# Patient Record
Sex: FEMALE | Race: WHITE | Employment: UNEMPLOYED | URBAN - METROPOLITAN AREA
[De-identification: names, ages, dates, MRNs, and addresses within clinical notes are randomized per-mention and may not be internally consistent; named-entity substitution may affect disease eponyms.]

---

## 2017-03-20 ENCOUNTER — APPOINTMENT (OUTPATIENT)
Dept: PHYSICAL THERAPY | Age: 32
End: 2017-03-20

## 2018-11-01 ENCOUNTER — HOSPITAL ENCOUNTER (OUTPATIENT)
Dept: LAB | Age: 33
Discharge: HOME | End: 2018-11-01
Payer: OTHER GOVERNMENT

## 2018-11-01 LAB
ANTIBODY SCREEN, EXTERNAL: NEGATIVE
CHLAMYDIA, EXTERNAL: NEGATIVE
HBSAG, EXTERNAL: NEGATIVE
HCT, EXTERNAL: 39.4
HEPATITIS C AB,   EXT: NEGATIVE
HGB, EXTERNAL: 13.1
HIV, EXTERNAL: NEGATIVE
N. GONORRHEA, EXTERNAL: NEGATIVE
PLATELET CNT,   EXTERNAL: 180
RPR, EXTERNAL: NEGATIVE
RUBELLA, EXTERNAL: NORMAL

## 2018-11-01 PROCEDURE — 88175 CYTOPATH C/V AUTO FLUID REDO: CPT | Performed by: OBSTETRICS & GYNECOLOGY

## 2018-11-01 PROCEDURE — 87624 HPV HI-RISK TYP POOLED RSLT: CPT | Performed by: OBSTETRICS & GYNECOLOGY

## 2018-11-27 ENCOUNTER — OFFICE VISIT (OUTPATIENT)
Dept: CARDIOLOGY CLINIC | Age: 33
End: 2018-11-27

## 2018-11-27 VITALS
DIASTOLIC BLOOD PRESSURE: 76 MMHG | WEIGHT: 148 LBS | HEIGHT: 68 IN | OXYGEN SATURATION: 98 % | HEART RATE: 74 BPM | BODY MASS INDEX: 22.43 KG/M2 | SYSTOLIC BLOOD PRESSURE: 121 MMHG

## 2018-11-27 DIAGNOSIS — R00.2 PALPITATIONS: Primary | ICD-10-CM

## 2018-11-27 NOTE — PATIENT INSTRUCTIONS
Jayda Inman will call to schedule your testing within 24-48 hours. If you do not hear from her, then please call her directly at [phone].     All testing/lab work is completed in [address] [place] [address] 150   at [facility] ordered_ They will contact you to

## 2018-11-27 NOTE — PROGRESS NOTES
1. Have you been to the ER, urgent care clinic since your last visit? Hospitalized since your last visit? No    2. Have you seen or consulted any other health care providers outside of the [address] [place] [address] since your last visit? Include any pap smears or colon screening.  No

## 2018-11-27 NOTE — PROGRESS NOTES
[facility]    Ms. Radha Farr is a 28year old female with a history of borderline hypertension. Ms. Radha Farr was asked to come see me for the evaluation of palpitations. Ms. Radha Farr is ten weeks pregnant. She has been experiencing some palpitations on and off for the last five weeks. Initially it was almost on a daily basis and especially happening at nighttime. Over the last couple weeks it has decreased in frequency, however now it is happening sometimes during the daytime. The palpitations last usually less than five seconds and feels like fluttering sensation in the chest.  Sometimes it makes her feel short of breath for a few seconds. She denies any chest pain or chest tightness with rest or exertion. She denies any presyncope or syncope. She had episodes of palpitations in the past, which was six years ago, and she saw a cardiologist in [place] at that time. She denies any exertional symptoms of fatigue. Before she was pregnant she was working out five to six times a week. She denies any excessive weight loss or weight gain. Denies diarrhea or constipation. Denies any excessive heat or cold intolerance. Denies any nausea, vomiting, abdominal pain, fever, chills, sputum production. No hematuria or other bleeding complaints    Past Medical History:   Diagnosis Date    Human papilloma virus 2006    Hypertension          Past Surgical History:   Procedure Laterality Date    HX LEEP PROCEDURE Bilateral 05/2004       Current Outpatient Medications   Medication Sig    prenatal vit-calcium-iron-fa (PRENATAL PLUS WITH CALCIUM) 27 mg iron- 1 mg tab Take 1 Tab by mouth daily. Indications: PREGNANCY    Cholecalciferol, Vitamin D3, (VITAMIN D3) 1,000 unit cap Take 1 Cap by mouth. No current facility-administered medications for this visit.         Allergies and Sensitivities:  Allergies   Allergen Reactions    Penicillin G Rash       Family History:  Family History   Problem Relation Age of Onset    Osteoporosis Mother     Heart Disease Father     Hypertension Father     High Cholesterol Father     Hypertension Sister     Ulcerative Colitis Brother        Social History:  Social History     Tobacco Use    Smoking status: Former Smoker     Last attempt to quit: 2007     Years since quittin.0    Smokeless tobacco: Never Used   Substance Use Topics    Alcohol use: No    Drug use: No     She  reports that she quit smoking about 11 years ago. she has never used smokeless tobacco.  She  reports that she does not drink alcohol. Review of Systems:  Cardiac symptoms as noted above in HPI. All others negative. Denies fatigue, malaise, skin rash, joint pain, blurring vision, photophobia, neck pain, hemoptysis, chronic cough, nausea, vomiting, hematuria, burning micturition, BRBPR, chronic headaches. Physical Exam:  BP Readings from Last 3 Encounters:   18 121/76   16 129/74   07/27/15 122/78         Pulse Readings from Last 3 Encounters:   18 74   16 72   07/27/15 76          Wt Readings from Last 3 Encounters:   18 148 lb (67.1 kg)   16 175 lb (79.4 kg)   07/27/15 144 lb (65.3 kg)       Constitutional: Oriented to person, place, and time. HENT: Head: Normocephalic and atraumatic. Neck: No JVD present. Carotid bruit is not appreciated. Cardiovascular: Regular rhythm. No murmur, gallop or rubs appreciated  Lung: Breath sounds normal. No respiratory distress. No ronchi or rales appreciated  Abdominal: No tenderness. No rebound and no guarding. Musculoskeletal: There is no lower extremity edema. No cynosis  Lymphadenopathy:  No cervical or supraclavicular adenopathy appriciated. Neurological: No gross motor deficit noted. Skin: No visible skin rash noted. No Ear discharge noted  Psychiatric: Normal mood and affect.    Good distal pulse    Review of Data  LABS:   No results found for: NA, K, CL, CO2, GLU, BUN, CREA  No flowsheet data found. No results found for: GPT, ALT  No results found for: HBA1C, HGBE8, GAU4FRGX, HVR8OZAB, UBT2XIHZ    EKG  (11/18) Sinus rhythm 70 bpm.     ECHO    IMPRESSION & PLAN:  Ms. Misbah Cohen is a 28year old female with no significant past medical history. In regards to palpitations, this is happening for the last five weeks. This is happening since she has found out she is pregnant. There is no syncope or presyncope. There is no angina or heart failure symptoms. On EKG there is no preexcitation. I would recommend that she place Event monitor for three to four weeks to see if she has any underlying cardiac arrhythmia. An echocardiogram has been ordered to rule out any structural abnormality of the heart. By history she does not appear to have any symptoms to suggest hyperthyroidism. Lab is being monitored frequently by OB/GYN team.  I will make further recommendations based on test findings as mentioned above. I have reassured her and advised her to keep herself well hydrated. She drinks two coffees a day. I have asked her to reduce or even stop drinking coffee and see if there is any improvement in the symptoms. This plan was discussed with patient who is in agreement. Thank you for allowing me to participate in patient care. Please feel free to call me if you have any question or concern. Pooja España MD  Please note: This document has been produced using voice recognition software. Unrecognized errors in transcription may be present.

## 2018-12-05 ENCOUNTER — HOSPITAL ENCOUNTER (OUTPATIENT)
Dept: NON INVASIVE DIAGNOSTICS | Age: 33
Discharge: HOME | End: 2018-12-05
Attending: INTERNAL MEDICINE
Payer: OTHER GOVERNMENT

## 2018-12-05 DIAGNOSIS — R00.2 PALPITATIONS: ICD-10-CM

## 2018-12-05 PROCEDURE — 93306 TTE W/DOPPLER COMPLETE: CPT

## 2018-12-11 ENCOUNTER — TELEPHONE (OUTPATIENT)
Dept: CARDIOLOGY CLINIC | Age: 33
End: 2018-12-11

## 2018-12-11 NOTE — TELEPHONE ENCOUNTER
Contacted pt at [facility] number. Two patient Identifiers confirmed. Advised pt per Dr Shirley Grissom. Advised preventice was processing. Pt verbalized understanding.

## 2018-12-11 NOTE — TELEPHONE ENCOUNTER
Patient would like to be called with her Echocardiogram results, she states she has not been contacted regarding her heart monitor. Please call and advise.

## 2018-12-20 ENCOUNTER — TELEPHONE (OUTPATIENT)
Dept: CARDIOLOGY CLINIC | Age: 33
End: 2018-12-20

## 2018-12-20 NOTE — TELEPHONE ENCOUNTER
Verbal order and read back per Ginna Reid MD  Advised pt of importance of having monitor but if she would like to d/c because of symptoms ok.

## 2018-12-20 NOTE — TELEPHONE ENCOUNTER
Patient would like to take off her 30 day event monitor early, due to skin irritation. Please call and advise.

## 2018-12-20 NOTE — TELEPHONE ENCOUNTER
Contacted pt at [facility] number. Two patient Identifiers confirmed. Advised pt per Dr Leonel Fajardo notes. Pt verbalized understanding and stated she wishes to d/c.

## 2019-01-11 DIAGNOSIS — R00.2 PALPITATIONS: Primary | ICD-10-CM

## 2019-01-11 NOTE — PROGRESS NOTES
Per office visit on 11/27/18, placed order for 30 day event monitor.      Verbal order and read back per Gareth Dhaliwal MD

## 2019-01-22 ENCOUNTER — OFFICE VISIT (OUTPATIENT)
Dept: CARDIOLOGY CLINIC | Age: 34
End: 2019-01-22

## 2019-01-22 VITALS
DIASTOLIC BLOOD PRESSURE: 67 MMHG | SYSTOLIC BLOOD PRESSURE: 113 MMHG | WEIGHT: 153 LBS | HEART RATE: 82 BPM | HEIGHT: 68 IN | OXYGEN SATURATION: 99 % | BODY MASS INDEX: 23.19 KG/M2

## 2019-01-22 DIAGNOSIS — R00.2 PALPITATIONS: Primary | ICD-10-CM

## 2019-01-22 NOTE — PROGRESS NOTES
[facility]    Ms. Karyle Chew is a 28 y.o. female with a history of borderline hypertension. Patient is here today for follow-up appointment. She admits that compared to last visit, she is feeling much better. Around the [place] time, she was stressed out because of holiday season and she felt some palpitation and fluttering. Her heart rate at the time was 90 bpm which according to the patient is high for her. She denies any presyncope or syncope she denies any resting or exertional chest pain or chest tightness to be concerned of angina. She denies PND or lower cavity swelling. Past Medical History:   Diagnosis Date    Human papilloma virus 2006    Hypertension          Past Surgical History:   Procedure Laterality Date    HX LEEP PROCEDURE Bilateral 2004       Current Outpatient Medications   Medication Sig    prenatal vit-calcium-iron-fa (PRENATAL PLUS WITH CALCIUM) 27 mg iron- 1 mg tab Take 1 Tab by mouth daily. Indications: PREGNANCY    Cholecalciferol, Vitamin D3, (VITAMIN D3) 1,000 unit cap Take 1 Cap by mouth. No current facility-administered medications for this visit. Allergies and Sensitivities:  Allergies   Allergen Reactions    Penicillin G Rash       Family History:  Family History   Problem Relation Age of Onset    Osteoporosis Mother     Heart Disease Father     Hypertension Father     High Cholesterol Father     Hypertension Sister     Ulcerative Colitis Brother        Social History:  Social History     Tobacco Use    Smoking status: Former Smoker     Last attempt to quit: 2007     Years since quittin.1    Smokeless tobacco: Never Used   Substance Use Topics    Alcohol use: No    Drug use: No     She  reports that she quit smoking about 11 years ago. she has never used smokeless tobacco.  She  reports that she does not drink alcohol.     Review of Systems:  Cardiac symptoms as noted above in HPI. All others negative. Denies fatigue, malaise, skin rash, joint pain, blurring vision, photophobia, neck pain, hemoptysis, chronic cough, nausea, vomiting, hematuria, burning micturition, BRBPR, chronic headaches. Physical Exam:  BP Readings from Last 3 Encounters:   01/22/19 113/67   11/27/18 121/76   12/30/16 129/74         Pulse Readings from Last 3 Encounters:   01/22/19 82   11/27/18 74   12/30/16 72          Wt Readings from Last 3 Encounters:   01/22/19 153 lb (69.4 kg)   11/27/18 148 lb (67.1 kg)   12/28/16 175 lb (79.4 kg)       Constitutional: Oriented to person, place, and time. HENT: Head: Normocephalic and atraumatic. Neck: No JVD present. Carotid bruit is not appreciated. Cardiovascular: Regular rhythm. No murmur, gallop or rubs appreciated  Lung: Breath sounds normal. No respiratory distress. No ronchi or rales appreciated  Abdominal: No tenderness. No rebound and no guarding. Musculoskeletal: There is no lower extremity edema. No cynosis    Review of Data  LABS:   No results found for: NA, K, CL, CO2, GLU, BUN, CREA  No flowsheet data found. No results found for: GPT, ALT  No results found for: HBA1C, HGBE8, SNA7HUFW, KFC9MQTM, VYF7KVOY    EKG  (11/18) Sinus rhythm 70 bpm.     ECHO (01/19)  Left Ventricle Normal cavity size and wall thickness. There is low normal systolic function. The calculated ejection fraction is 50%. Visually measured ejection fraction. There is age-appropriate left ventricular diastolic function. Left Atrium Normal cavity size. Left Atrium volume index is 28 mL/m2. Right Ventricle Normal cavity size and global systolic function. Right Atrium Normal size. Aortic Valve Trileaflet aortic valve structure. No stenosis and no regurgitation. Mitral Valve Normal valve structure and no stenosis. Trace regurgitation. Tricuspid Valve Normal valve structure and no stenosis. Trace tricuspid valve regurgitation.    Pulmonic Valve Normal valve structure and no stenosis. Trace regurgitation. Aorta Normal aortic root, ascending aortic, and aortic arch. Event monitor: (12/18)  Sinus rhythm at 75 bpm ( bpm)  No evidence of atrial fibrillation  546 total PVCs, less than 1%  No heart block or pauses noted  1 event of most likely rate related aberrancy with a total of 12 beats at 110 bpm, asymptomatic    IMPRESSION & PLAN:  Ms. Mick Orozco is a 28 y.o.  female with no significant past medical history. In regards to palpitations, per patient this appears to be much better compared to last visit. She thinks that her stress level is reducing. Her echocardiogram and event monitor in December 2018, as mentioned above. No valvular heart disease noted. No significant symptomatic arrhythmia noted. I discussed this results with patient in detail. As her symptoms are significantly improving and she has no presyncope or syncope or any other symptoms to suggest angina or heart failure at this time, I do not believe any further cardiac workup is needed. She was advised to contact me if she has any worsening of the symptoms or she has any presyncope or syncope. This plan was discussed with patient who is in agreement. Thank you for allowing me to participate in patient care. Please feel free to call me if you have any question or concern. Shantanu River MD  Please note: This document has been produced using voice recognition software. Unrecognized errors in transcription may be present.

## 2019-05-22 LAB — GRBS, EXTERNAL: NEGATIVE

## 2019-06-24 ENCOUNTER — HOSPITAL ENCOUNTER (INPATIENT)
Age: 34
LOS: 2 days | Discharge: HOME | End: 2019-06-26
Attending: OBSTETRICS & GYNECOLOGY | Admitting: OBSTETRICS & GYNECOLOGY
Payer: OTHER GOVERNMENT

## 2019-06-24 PROBLEM — O09.523 MULTIGRAVIDA OF ADVANCED MATERNAL AGE IN THIRD TRIMESTER: Status: ACTIVE | Noted: 2019-06-24

## 2019-06-24 PROBLEM — Z34.90 PREGNANT: Status: ACTIVE | Noted: 2018-11-01

## 2019-06-24 PROCEDURE — 65270000029 HC RM PRIVATE

## 2019-06-24 RX ORDER — MISOPROSTOL 200 UG/1
800 TABLET ORAL
Status: DISPENSED | OUTPATIENT
Start: 2019-06-24 | End: 2019-06-25

## 2019-06-24 RX ORDER — SALICYLIC ACID
90 POWDER (GRAM) MISCELLANEOUS ONCE
Status: COMPLETED | OUTPATIENT
Start: 2019-06-25 | End: 2019-06-25

## 2019-06-24 RX ORDER — PROMETHAZINE HYDROCHLORIDE 25 MG/ML
25 INJECTION, SOLUTION INTRAMUSCULAR; INTRAVENOUS
Status: DISCONTINUED | OUTPATIENT
Start: 2019-06-24 | End: 2019-06-25 | Stop reason: HOSPADM

## 2019-06-24 RX ORDER — LIDOCAINE HYDROCHLORIDE 10 MG/ML
20 INJECTION, SOLUTION EPIDURAL; INFILTRATION; INTRACAUDAL; PERINEURAL AS NEEDED
Status: DISCONTINUED | OUTPATIENT
Start: 2019-06-24 | End: 2019-06-25 | Stop reason: HOSPADM

## 2019-06-24 RX ORDER — SODIUM CHLORIDE, SODIUM LACTATE, POTASSIUM CHLORIDE, CALCIUM CHLORIDE 600; 310; 30; 20 MG/100ML; MG/100ML; MG/100ML; MG/100ML
125 INJECTION, SOLUTION INTRAVENOUS CONTINUOUS
Status: DISCONTINUED | OUTPATIENT
Start: 2019-06-25 | End: 2019-06-25 | Stop reason: HOSPADM

## 2019-06-24 RX ORDER — METHYLERGONOVINE MALEATE 0.2 MG/ML
0.2 INJECTION INTRAVENOUS AS NEEDED
Status: DISCONTINUED | OUTPATIENT
Start: 2019-06-24 | End: 2019-06-25 | Stop reason: HOSPADM

## 2019-06-24 RX ORDER — OXYTOCIN 10 [USP'U]/ML
10 INJECTION, SOLUTION INTRAMUSCULAR; INTRAVENOUS
Status: DISCONTINUED | OUTPATIENT
Start: 2019-06-24 | End: 2019-06-25 | Stop reason: HOSPADM

## 2019-06-24 RX ORDER — NALBUPHINE HYDROCHLORIDE 10 MG/ML
10 INJECTION, SOLUTION INTRAMUSCULAR; INTRAVENOUS; SUBCUTANEOUS
Status: DISCONTINUED | OUTPATIENT
Start: 2019-06-24 | End: 2019-06-25 | Stop reason: HOSPADM

## 2019-06-24 RX ORDER — OXYTOCIN/RINGER'S LACTATE 20/1000 ML
125 PLASTIC BAG, INJECTION (ML) INTRAVENOUS CONTINUOUS
Status: DISCONTINUED | OUTPATIENT
Start: 2019-06-25 | End: 2019-06-25 | Stop reason: HOSPADM

## 2019-06-24 RX ORDER — TERBUTALINE SULFATE 1 MG/ML
0.25 INJECTION SUBCUTANEOUS
Status: DISCONTINUED | OUTPATIENT
Start: 2019-06-24 | End: 2019-06-25 | Stop reason: HOSPADM

## 2019-06-24 RX ORDER — OXYTOCIN/RINGER'S LACTATE 20/1000 ML
999 PLASTIC BAG, INJECTION (ML) INTRAVENOUS ONCE
Status: COMPLETED | OUTPATIENT
Start: 2019-06-25 | End: 2019-06-25

## 2019-06-24 RX ORDER — MAG HYDROX/ALUMINUM HYD/SIMETH 200-200-20
15 SUSPENSION, ORAL (FINAL DOSE FORM) ORAL
Status: DISCONTINUED | OUTPATIENT
Start: 2019-06-24 | End: 2019-06-25 | Stop reason: HOSPADM

## 2019-06-25 ENCOUNTER — ANESTHESIA (OUTPATIENT)
Dept: LABOR AND DELIVERY | Age: 34
End: 2019-06-25
Payer: OTHER GOVERNMENT

## 2019-06-25 ENCOUNTER — ANESTHESIA EVENT (OUTPATIENT)
Dept: LABOR AND DELIVERY | Age: 34
End: 2019-06-25
Payer: OTHER GOVERNMENT

## 2019-06-25 PROBLEM — O09.523 MULTIGRAVIDA OF ADVANCED MATERNAL AGE IN THIRD TRIMESTER: Status: RESOLVED | Noted: 2019-06-24 | Resolved: 2019-06-25

## 2019-06-25 PROBLEM — Z34.90 PREGNANT: Status: RESOLVED | Noted: 2018-11-01 | Resolved: 2019-06-25

## 2019-06-25 LAB
ABO + RH BLD: NORMAL
BASOPHILS # BLD: 0 K/UL (ref 0–0.1)
BASOPHILS NFR BLD: 0 % (ref 0–2)
BLOOD GROUP ANTIBODIES SERPL: NORMAL
DIFFERENTIAL METHOD BLD: ABNORMAL
EOSINOPHIL # BLD: 0.1 K/UL (ref 0–0.4)
EOSINOPHIL NFR BLD: 1 % (ref 0–5)
ERYTHROCYTE [DISTWIDTH] IN BLOOD BY AUTOMATED COUNT: 13.4 % (ref 11.6–14.5)
HCT VFR BLD AUTO: 36.3 % (ref 35–45)
HGB BLD-MCNC: 12.4 G/DL (ref 12–16)
LYMPHOCYTES # BLD: 1.6 K/UL (ref 0.9–3.6)
LYMPHOCYTES NFR BLD: 19 % (ref 21–52)
MCH RBC QN AUTO: 30.7 PG (ref 24–34)
MCHC RBC AUTO-ENTMCNC: 34.2 G/DL (ref 31–37)
MCV RBC AUTO: 89.9 FL (ref 74–97)
MONOCYTES # BLD: 0.5 K/UL (ref 0.05–1.2)
MONOCYTES NFR BLD: 6 % (ref 3–10)
NEUTS SEG # BLD: 6.4 K/UL (ref 1.8–8)
NEUTS SEG NFR BLD: 74 % (ref 40–73)
PLATELET # BLD AUTO: 136 K/UL (ref 135–420)
PMV BLD AUTO: 11.6 FL (ref 9.2–11.8)
RBC # BLD AUTO: 4.04 M/UL (ref 4.2–5.3)
SPECIMEN EXP DATE BLD: NORMAL
WBC # BLD AUTO: 8.5 K/UL (ref 4.6–13.2)

## 2019-06-25 PROCEDURE — 86900 BLOOD TYPING SEROLOGIC ABO: CPT

## 2019-06-25 PROCEDURE — 77030007879 HC KT SPN EPDRL TELE -B: Performed by: NURSE ANESTHETIST, CERTIFIED REGISTERED

## 2019-06-25 PROCEDURE — 74011000250 HC RX REV CODE- 250

## 2019-06-25 PROCEDURE — 85025 COMPLETE CBC W/AUTO DIFF WBC: CPT

## 2019-06-25 PROCEDURE — 75410000000 HC DELIVERY VAGINAL/SINGLE

## 2019-06-25 PROCEDURE — 75410000002 HC LABOR FEE PER 1 HR

## 2019-06-25 PROCEDURE — 74011250637 HC RX REV CODE- 250/637: Performed by: ADVANCED PRACTICE MIDWIFE

## 2019-06-25 PROCEDURE — 74011250636 HC RX REV CODE- 250/636: Performed by: NURSE ANESTHETIST, CERTIFIED REGISTERED

## 2019-06-25 PROCEDURE — 76060000078 HC EPIDURAL ANESTHESIA

## 2019-06-25 PROCEDURE — 74011000250 HC RX REV CODE- 250: Performed by: NURSE ANESTHETIST, CERTIFIED REGISTERED

## 2019-06-25 PROCEDURE — 74011250636 HC RX REV CODE- 250/636: Performed by: ADVANCED PRACTICE MIDWIFE

## 2019-06-25 PROCEDURE — 65270000029 HC RM PRIVATE

## 2019-06-25 PROCEDURE — 77030034849

## 2019-06-25 PROCEDURE — 75410000003 HC RECOV DEL/VAG/CSECN EA 0.5 HR

## 2019-06-25 RX ORDER — LIDOCAINE HYDROCHLORIDE AND EPINEPHRINE 15; 5 MG/ML; UG/ML
INJECTION, SOLUTION EPIDURAL AS NEEDED
Status: DISCONTINUED | OUTPATIENT
Start: 2019-06-25 | End: 2019-06-25 | Stop reason: HOSPADM

## 2019-06-25 RX ORDER — PROMETHAZINE HYDROCHLORIDE 25 MG/ML
25 INJECTION, SOLUTION INTRAMUSCULAR; INTRAVENOUS
Status: DISCONTINUED | OUTPATIENT
Start: 2019-06-25 | End: 2019-06-26 | Stop reason: HOSPADM

## 2019-06-25 RX ORDER — DIPHENHYDRAMINE HYDROCHLORIDE 50 MG/ML
25 INJECTION, SOLUTION INTRAMUSCULAR; INTRAVENOUS
Status: DISCONTINUED | OUTPATIENT
Start: 2019-06-25 | End: 2019-06-25 | Stop reason: HOSPADM

## 2019-06-25 RX ORDER — OXYTOCIN/0.9 % SODIUM CHLORIDE 30/500 ML
0-20 PLASTIC BAG, INJECTION (ML) INTRAVENOUS
Status: DISCONTINUED | OUTPATIENT
Start: 2019-06-25 | End: 2019-06-26

## 2019-06-25 RX ORDER — SODIUM CHLORIDE 0.9 % (FLUSH) 0.9 %
5-40 SYRINGE (ML) INJECTION EVERY 8 HOURS
Status: DISCONTINUED | OUTPATIENT
Start: 2019-06-25 | End: 2019-06-25 | Stop reason: HOSPADM

## 2019-06-25 RX ORDER — BUPIVACAINE HYDROCHLORIDE 2.5 MG/ML
INJECTION, SOLUTION EPIDURAL; INFILTRATION; INTRACAUDAL AS NEEDED
Status: DISCONTINUED | OUTPATIENT
Start: 2019-06-25 | End: 2019-06-25 | Stop reason: HOSPADM

## 2019-06-25 RX ORDER — ACETAMINOPHEN 325 MG/1
650 TABLET ORAL
Status: DISCONTINUED | OUTPATIENT
Start: 2019-06-25 | End: 2019-06-26 | Stop reason: HOSPADM

## 2019-06-25 RX ORDER — IBUPROFEN 400 MG/1
800 TABLET ORAL
Status: DISCONTINUED | OUTPATIENT
Start: 2019-06-25 | End: 2019-06-26 | Stop reason: HOSPADM

## 2019-06-25 RX ORDER — SODIUM CHLORIDE 0.9 % (FLUSH) 0.9 %
5-40 SYRINGE (ML) INJECTION AS NEEDED
Status: DISCONTINUED | OUTPATIENT
Start: 2019-06-25 | End: 2019-06-25 | Stop reason: HOSPADM

## 2019-06-25 RX ORDER — AMOXICILLIN 250 MG
1 CAPSULE ORAL 2 TIMES DAILY
Status: DISCONTINUED | OUTPATIENT
Start: 2019-06-25 | End: 2019-06-26 | Stop reason: HOSPADM

## 2019-06-25 RX ORDER — PHENYLEPHRINE HCL IN 0.9% NACL 0.4MG/10ML
100 SYRINGE (ML) INTRAVENOUS AS NEEDED
Status: DISCONTINUED | OUTPATIENT
Start: 2019-06-25 | End: 2019-06-25 | Stop reason: HOSPADM

## 2019-06-25 RX ORDER — NALOXONE HYDROCHLORIDE 0.4 MG/ML
0.2 INJECTION, SOLUTION INTRAMUSCULAR; INTRAVENOUS; SUBCUTANEOUS AS NEEDED
Status: DISCONTINUED | OUTPATIENT
Start: 2019-06-25 | End: 2019-06-25 | Stop reason: HOSPADM

## 2019-06-25 RX ADMIN — IBUPROFEN 800 MG: 400 TABLET ORAL at 09:26

## 2019-06-25 RX ADMIN — SODIUM CHLORIDE, SODIUM LACTATE, POTASSIUM CHLORIDE, AND CALCIUM CHLORIDE 125 ML/HR: 600; 310; 30; 20 INJECTION, SOLUTION INTRAVENOUS at 00:00

## 2019-06-25 RX ADMIN — OXYTOCIN-SODIUM CHLORIDE 0.9% IV SOLN 30 UNIT/500ML 4 MILLI-UNITS/MIN: 30-0.9/5 SOLUTION at 04:51

## 2019-06-25 RX ADMIN — Medication 10 ML/HR: at 02:43

## 2019-06-25 RX ADMIN — SODIUM CHLORIDE, SODIUM LACTATE, POTASSIUM CHLORIDE, AND CALCIUM CHLORIDE 125 ML/HR: 600; 310; 30; 20 INJECTION, SOLUTION INTRAVENOUS at 04:27

## 2019-06-25 RX ADMIN — Medication 19980 MILLI-UNITS/HR: at 06:43

## 2019-06-25 RX ADMIN — LIDOCAINE HYDROCHLORIDE AND EPINEPHRINE 3 ML: 15; 5 INJECTION, SOLUTION EPIDURAL at 02:35

## 2019-06-25 RX ADMIN — BUPIVACAINE HYDROCHLORIDE 5 ML: 2.5 INJECTION, SOLUTION EPIDURAL; INFILTRATION; INTRACAUDAL at 02:42

## 2019-06-25 RX ADMIN — CASTOR OIL 60 ML: 1 LIQUID ORAL at 06:10

## 2019-06-25 RX ADMIN — BUPIVACAINE HYDROCHLORIDE 5 ML: 2.5 INJECTION, SOLUTION EPIDURAL; INFILTRATION; INTRACAUDAL at 02:38

## 2019-06-25 RX ADMIN — IBUPROFEN 800 MG: 400 TABLET ORAL at 17:26

## 2019-06-25 RX ADMIN — OXYTOCIN-SODIUM CHLORIDE 0.9% IV SOLN 30 UNIT/500ML 2 MILLI-UNITS/MIN: 30-0.9/5 SOLUTION at 04:23

## 2019-06-25 RX ADMIN — ACETAMINOPHEN 650 MG: 325 TABLET, FILM COATED ORAL at 19:44

## 2019-06-25 RX ADMIN — SENNOSIDES AND DOCUSATE SODIUM 1 TABLET: 8.6; 5 TABLET ORAL at 15:28

## 2019-06-25 RX ADMIN — SODIUM CHLORIDE, SODIUM LACTATE, POTASSIUM CHLORIDE, AND CALCIUM CHLORIDE 1000 ML: 600; 310; 30; 20 INJECTION, SOLUTION INTRAVENOUS at 01:15

## 2019-06-25 RX ADMIN — ACETAMINOPHEN 650 MG: 325 TABLET, FILM COATED ORAL at 15:28

## 2019-06-25 NOTE — H&P
History & Physical    Name: Neil Gr MRN: [id number]  SSN: [id number]    YOB: 1985  Age: 27 y.o. Sex: female      Subjective:     Estimated Date of Delivery: 19  OB History        6    Para   2    Term   2            AB   3    Living   2       SAB   3    TAB        Ectopic        Molar        Multiple   0    Live Births   2                14 term , boy, unmedicated  12/15 SAB  16 SAB  16 term , boy, epidural   SAB    Neil Gr has been having ctxs with increasing intensity all day. She is admitted with pregnancy at 40w5d for early labor. Prenatal course was notable for advanced maternal age. All testing wnl. Please see prenatal records for details. She began prenatal care with [facility] on 2018 at 7.1 wks GA confirmed by TVUS. Her total weight gain for this pregnancy has been 31 lbs. No Known Allergies  Family History   Problem Relation Age of Onset    Osteoporosis Mother     Heart Disease Father     Hypertension Father     High Cholesterol Father     Hypertension Sister     Ulcerative Colitis Brother       reports that she quit smoking about 11 years ago. She has never used smokeless tobacco. She reports that she does not drink alcohol or use drugs. Objective:     Vitals:  Vitals:    19 2326   Weight: 79.4 kg (175 lb)   Height: 5' 8\" (1.727 m)        Physical Exam:  Patient with distress. of labor  5 cm dilated    80% effaced    -2 station    Presenting Part: cephalic  Cervical Position: mid position  Consistency: Soft  Membranes:  Intact  Fetal Heart Rate & Contraction pattern: Baseline: 120 per minute  Variability: moderate  Accelerations: yes  Decelerations: none  Uterine contractions: irregular, every 5-8 minutes, lasting 60 seconds, mild-mod to palpation  EFW: 7 lbs    Prenatal Labs:   O NEGATIVE, immune, HIV/HepB/HepC/GC/CT neg, VDRL NR    Group B Strep was negative.     Assessment/Plan: Plan: Admit to [phone] for labor. She had planned an unmedicated birth, but tired from ctxs so discussing pain management plan. Will start IV access and give a bolus now to see how labor may be improved. Will monitor intermittently per protocol and pt's preference. Anticipate vaginal birth. Sharmin Bernard MD notified.      Signed By:  Katarina Walton CNM     June 24, 2019

## 2019-06-25 NOTE — ANESTHESIA PREPROCEDURE EVALUATION
Relevant Problems   No relevant active problems       Anesthetic History   No history of anesthetic complications            Review of Systems / Medical History  Patient summary reviewed and nursing notes reviewed    Pulmonary  Within defined limits                 Neuro/Psych   Within defined limits           Cardiovascular    Hypertension: well controlled                   GI/Hepatic/Renal  Within defined limits              Endo/Other  Within defined limits           Other Findings              Physical Exam    Airway  Mallampati: II  TM Distance: 4 - 6 cm  Neck ROM: normal range of motion        Cardiovascular  Regular rate and rhythm,  S1 and S2 normal,  no murmur, click, rub, or gallop             Dental  No notable dental hx       Pulmonary  Breath sounds clear to auscultation               Abdominal  GI exam deferred       Other Findings            Anesthetic Plan    ASA: 2  Anesthesia type: epidural            Anesthetic plan and risks discussed with: Patient and Family

## 2019-06-25 NOTE — ROUTINE PROCESS
TRANSFER - IN REPORT: 
 
Verbal report received from Ailyn Ventura RN M(name) on Selma Chapa  being received from L&D(unit) for routine progression of care Report consisted of patients Situation, Background, Assessment and  
Recommendations(SBAR). Information from the following report(s) SBAR, Kardex, [facility] and Recent Results was reviewed with the receiving nurse. Opportunity for questions and clarification was provided. Assessment completed upon patients arrival to unit and care assumed.

## 2019-06-25 NOTE — ANESTHESIA PROCEDURE NOTES
Epidural Block    Start time: 6/25/2019 2:21 AM  End time: 6/25/2019 2:47 AM  Performed by: Tommie Cronin CRNA  Authorized by: Tommie Cronin CRNA     Pre-Procedure  Indication: labor epidural    Preanesthetic Checklist: patient identified, risks and benefits discussed, anesthesia consent, site marked, patient being monitored, timeout performed and anesthesia consent    Timeout Time: 02:24        Epidural:   Patient position:  Seated  Prep region:  Lumbar  Prep: Betadine    Location:  L3-4    Needle and Epidural Catheter:   Needle Type:  Tuohy  Needle Gauge:  18 G  Injection Technique:  Loss of resistance using saline  Attempts:  1  Catheter Size:  20 G  Catheter at Skin Depth (cm):  14  Depth in Epidural Space (cm):  7  Events: no blood with aspiration, no cerebrospinal fluid with aspiration, no paresthesia and negative aspiration test    Test Dose:  Negative    Assessment:   Catheter Secured:  Tegaderm and tape  Insertion:  Uncomplicated  Patient tolerance:  Patient tolerated the procedure well with no immediate complications

## 2019-06-25 NOTE — PROGRESS NOTES
presenting with ucs since 1830. Ucs strong , about q8 mins. Pt wishes to stat in her clothes for now. Discussed need for NST. PT LYING IN HER SIDE FOR MONITORING. SO @ bedside, supportive  2350 Gerald Musa @ bedside, Talking with pt, pt breathing, standing up with ucs. Pt undecided about management of Labor. 0030 Labs drawn, I v infusing  0100 In the shower after IV absorbed and monitoring. 0155 Out of shower, resting over the ball, wondering about Epidural.  0210 Anaesthesia paged. Pt coping but tierd   0225 Anaesthesia @ bedside  0228 Time out done   0236 Test dose  0239 L tilt Pt has congested cough  0245 Continous infusion forEpidural begun  0250 Comfortable. Loose congested cough, breath sounds clear. Declines any intervention for cough   0325 Turned on R side  0515 Turned on l side earlier, now c/o Srom, clear, L leg very heavy,willturn back on R SIDE.  0545 Pt called  Felt some pressure  0555 Sve by Gerald Musa, c/c 0  0602 Pt instructed on pushing, great progress noted  0614  OF vmi over intact perineum. Baby skin to skin.   4106 Baby @breast, good latch,Fundus firm

## 2019-06-25 NOTE — PROGRESS NOTES
0700 Received report from offgoing nurse M D\"Lex rn to oncoming nurse PRAVEEN Lilly rn via sbar at bedside 0930 Breakfast given ,baby to nursery for peds. Patient declines any needs 0930 Patient up to void \"left leg a little nuymb\" pericare given . Transfered to room 3405 via w/c with supportive  and baby at bedside. [facility] 0715 report given to MAURA Camacho rn

## 2019-06-25 NOTE — PROGRESS NOTES
Visited with mother and acquired permission to announce baby's name.     Candi Ortiz   Sp[facility]   [phone]

## 2019-06-25 NOTE — PROGRESS NOTES
Sandra Casey would like epidural. Says she is tired and can't imagine how she will push out a baby. Concerned about stories she has heard regarding ineffective pain relief despite her own experience with great epidural. Discussed management if epidural in adequate. She is also asking about Pitocin augmentation. Will start after epidural if indicated.

## 2019-06-25 NOTE — ROUTINE PROCESS
Bedside shift change report given to Cristina Borrero RN (oncoming nurse) by Salena Parrish RN (offgoing nurse). Report included the following information SBAR, Kardex, Procedure Summary, Intake/Output, MAR and Recent Results.

## 2019-06-26 VITALS
OXYGEN SATURATION: 99 % | DIASTOLIC BLOOD PRESSURE: 87 MMHG | HEIGHT: 68 IN | HEART RATE: 71 BPM | RESPIRATION RATE: 18 BRPM | SYSTOLIC BLOOD PRESSURE: 123 MMHG | BODY MASS INDEX: 26.52 KG/M2 | TEMPERATURE: 97.6 F | WEIGHT: 175 LBS

## 2019-06-26 LAB
HCT VFR BLD AUTO: 35.2 % (ref 35–45)
HGB BLD-MCNC: 11.6 G/DL (ref 12–16)

## 2019-06-26 PROCEDURE — 85018 HEMOGLOBIN: CPT

## 2019-06-26 PROCEDURE — 36415 COLL VENOUS BLD VENIPUNCTURE: CPT

## 2019-06-26 PROCEDURE — 85461 HEMOGLOBIN FETAL: CPT

## 2019-06-26 PROCEDURE — 74011250636 HC RX REV CODE- 250/636: Performed by: ADVANCED PRACTICE MIDWIFE

## 2019-06-26 PROCEDURE — 74011250637 HC RX REV CODE- 250/637: Performed by: ADVANCED PRACTICE MIDWIFE

## 2019-06-26 PROCEDURE — 85014 HEMATOCRIT: CPT

## 2019-06-26 PROCEDURE — 86900 BLOOD TYPING SEROLOGIC ABO: CPT

## 2019-06-26 RX ORDER — IBUPROFEN 800 MG/1
800 TABLET ORAL
Qty: 60 TAB | Refills: 1 | Status: SHIPPED | OUTPATIENT
Start: 2019-06-26

## 2019-06-26 RX ADMIN — IBUPROFEN 800 MG: 400 TABLET ORAL at 12:12

## 2019-06-26 RX ADMIN — HUMAN RHO(D) IMMUNE GLOBULIN 0.3 MG: 300 INJECTION, SOLUTION INTRAMUSCULAR at 15:12

## 2019-06-26 RX ADMIN — IBUPROFEN 800 MG: 400 TABLET ORAL at 03:28

## 2019-06-26 RX ADMIN — SENNOSIDES AND DOCUSATE SODIUM 1 TABLET: 8.6; 5 TABLET ORAL at 05:44

## 2019-06-26 RX ADMIN — ACETAMINOPHEN 650 MG: 325 TABLET, FILM COATED ORAL at 00:52

## 2019-06-26 NOTE — DISCHARGE INSTRUCTIONS

## 2019-06-26 NOTE — PROGRESS NOTES
Postpartum Day 1    Patient doing well post-partum without significant complaint. Pain controlled with current medication. Voiding without difficulty, normal lochia. Breastfeeding well. Baby stable. Pt is desiring discharge home today if pediatrician is in agreement. Vitals:    Patient Vitals for the past 8 hrs:   BP Temp Pulse Resp SpO2   19 0316 116/66 97.7 °F (36.5 °C) 61 16 100 %     Temp (24hrs), Av °F (36.7 °C), Min:97.7 °F (36.5 °C), Max:98.3 °F (36.8 °C)      Vital signs stable, afebrile. Exam:  Patient without distress. Breasts intact and nontender               Abdomen soft, fundus firm at level of umbilicus, nontender               Perineum with normal lochia noted. Lower extremities are negative for swelling or tenderness. Lab/Data Review:  CBC:   Lab Results   Component Value Date/Time    HGB 11.6 (L) 2019 05:45 AM    HCT 35.2 2019 05:45 AM       Assessment and Plan:  Patient appears to be having uncomplicated post-partum course. Continue routine perineal care and maternal education. Plan discharge tomorrow if no problems occur.     Jatinder Coronado, ELLIOTT  2019  8:10 AM

## 2019-06-26 NOTE — LACTATION NOTE
Mother breast fed two other babies. Mom states this baby has been latching and nursing well and baby is 32 hours old. Very experienced mother. Reviewed typical  feeding patterns/expectations in the first 24 hours. General discussion. Gave BF information, daily log and resource guide. Offered assistance if needed.

## 2019-06-26 NOTE — DISCHARGE SUMMARY
Obstetrical Discharge Summary       [place] [address] [facility] [address]  [address] [facility]  [phone]        Patient ID:[facility] CHANTELLZDAMION,[id number],84 y.o.,1985    Postpartum Day:    Information for the patient's :  Ct Ervin [[id number]]   2 days       Admit Date: 2019    Discharge Date: 2019     Admitting Physician: Kirstin Trevino MD     Admission Diagnoses: Labor and delivery indication for care or intervention [O75.9]    Discharge Diagnoses: same as above      Additional Diagnoses:Present on Admission:   Rh negative status during pregnancy in third trimester, antepartum   (Resolved) Pregnant   (Resolved) Multigravida of advanced maternal age in third trimester       Patient Active Problem List   Diagnosis Code    Rh negative status during pregnancy in third trimester, antepartum O26.893, Z67.91    Postpartum care following vaginal delivery Z39.2         Procedure:        Baby link  Information for the patient's :  Ct Ervin [[id number]]     Delivery Type: Vaginal, Spontaneous   Delivery Date: 2019   Delivery Time: 6:14 AM     Birth Weight: 4.21 kg     Sex:  male  Delivery Clinician:  Jules Hermosillo   Gestational Age: 38w9d    Presentation: Vertex   Position: Left  Occiput  Posterior     Apgars were 8  and 9      Resuscitation Method: Tactile Stimulation     Meconium Stained: None    Living Status: Living       Placenta Date/Time: 2019  6:23 AM   Placenta Removal: Expressed   Placenta Appearance: Normal;Intact    Cord Information: 3 Vessels    Cord Events: None       Disposition of Cord Blood: Lab    Blood Gases Sent?:  No         Baby procedures:    Information for the patient's :  Ct Ervin [[id number]]   Procedure to be Performed: circumcision      Feeding Method: Infant Feeding: Breastmilk    Immunizations:    Immunization History   Administered Date(s) Administered    DTaP 2019    MMR 2014  Rho(D) Immune Globulin - IM 2019, 2019        Immunizations:    Immunization History   Administered Date(s) Administered    DTaP 2019    MMR 2014    Rho(D) Immune Globulin - IM 2019, 2019       Group Beta Strep:   GrBStrep, External   Date Value Ref Range Status   2019 NEGATIVE  Final          WBC   Date/Time Value Ref Range Status   2019 12:28 AM 8.5 4.6 - 13.2 K/uL Final   2016 08:30 PM 11.2 4.6 - 13.2 K/uL Final   2014 12:30 AM 13.4 (H) 4.6 - 13.2 K/uL Final     HGB   Date/Time Value Ref Range Status   2019 05:45 AM 11.6 (L) 12.0 - 16.0 g/dL Final   2019 12:28 AM 12.4 12.0 - 16.0 g/dL Final   2016 06:03 AM 12.2 12.0 - 16.0 g/dL Final     PLATELET   Date/Time Value Ref Range Status   2019 12:28  135 - 420 K/uL Final     Hgb, External   Date/Time Value Ref Range Status   2018 13.1  Final     Platelet cnt., External   Date/Time Value Ref Range Status   2018 180  Final         Hospital Course: Unremarkable  Subjective:         Janes Benson is doing well, was seen and assessed by Daniella Bloom CNM this morning. Peds is d/c baby and Janes Benson would like to D/C to home as well. Objective:   Breasts Nontender and intact  Fundus firm and involuting  Lochia rubra, minimal  Legs minimal to no edema and without pain.      Lab/Data Review:  Patient Vitals for the past 8 hrs:   BP Temp Pulse Resp SpO2   19 1040 123/87 97.6 °F (36.4 °C) 71 18 99 %       Temp (24hrs), Av.7 °F (36.5 °C), Min:97.6 °F (36.4 °C), Max:97.7 °F (36.5 °C)      WBC   Date/Time Value Ref Range Status   2019 12:28 AM 8.5 4.6 - 13.2 K/uL Final   2016 08:30 PM 11.2 4.6 - 13.2 K/uL Final   2014 12:30 AM 13.4 (H) 4.6 - 13.2 K/uL Final     HGB   Date/Time Value Ref Range Status   2019 05:45 AM 11.6 (L) 12.0 - 16.0 g/dL Final   2019 12:28 AM 12.4 12.0 - 16.0 g/dL Final   2016 06:03 AM 12.2 12.0 - 16.0 g/dL Final PLATELET   Date/Time Value Ref Range Status   06/25/2019 12:28  135 - 420 K/uL Final     Hgb, External   Date/Time Value Ref Range Status   11/01/2018 13.1  Final     Platelet cnt., External   Date/Time Value Ref Range Status   11/01/2018 180  Final     Patient Instructions:   Current Discharge Medication List      START taking these medications    Details   ibuprofen (MOTRIN) 800 mg tablet Take 1 Tab by mouth every eight (8) hours as needed for Pain. Qty: 60 Tab, Refills: 1         CONTINUE these medications which have NOT CHANGED    Details   calcium-cholecalciferol, d3, (CALCIUM 600 + D) 600-125 mg-unit tab Take  by mouth.      prenatal vit-calcium-iron-fa (PRENATAL PLUS WITH CALCIUM) 27 mg iron- 1 mg tab Take 1 Tab by mouth daily. Indications: PREGNANCY      Cholecalciferol, Vitamin D3, (VITAMIN D3) 1,000 unit cap Take 1 Cap by mouth. Assessment:     Status post: postpartum vaginal delivery   Recovering well  Breastfeeding  Mood Stable      Plan:     Postpartum care discussed including diet, ambulation,actvitiy restrictions, and mood fluctuations. Discharge instructions and questions answered for vaginal delivery.   Follow in 6 wks or prn      Signed By: Nury Mccoy CNM     June 26, 2019

## 2019-06-26 NOTE — PROGRESS NOTES
Bedside and Verbal shift change report given to Saundra John RN (oncoming nurse) by Kalin Nascimento. Luis Sánchez RN (offgoing nurse). Report included the following information SBAR, Kardex, MAR, Recent Results and Med Rec Status. 1420: Patient requesting to be discharged today. Advised patient I would administer Rhogam, and that baby requires 24 hour testing and both require discharge orders. Called [address] [facility] & left message for  On call to enter discharge order. 1710: Reviewed discharge instructions with patient. Patient verbalized understanding of all discharge instructions reviewed. Opportunity for questions provided; patient's questions answered to her satisfaction. ID band numbers compared to baby's & confirmed to match. Electronic signature obtained. Patient to call when ready to be escorted downstairs for d/c home.

## 2019-06-27 LAB
ABO + RH BLD: NORMAL
ABO + RH BLDCO: NORMAL
BLD PROD TYP BPU: NORMAL
BPU ID: NORMAL
CALLED TO:,BCALL1: NORMAL
FETAL SCREEN,FMHS: NORMAL
STATUS OF UNIT,%ST: NORMAL
UNIT DIVISION, %UDIV: 0

## 2021-11-18 ENCOUNTER — HOSPITAL ENCOUNTER (OUTPATIENT)
Dept: LAB | Age: 36
Discharge: HOME | End: 2021-11-18
Payer: OTHER GOVERNMENT

## 2021-11-18 PROCEDURE — 88175 CYTOPATH C/V AUTO FLUID REDO: CPT

## 2021-11-18 PROCEDURE — 87624 HPV HI-RISK TYP POOLED RSLT: CPT

## 2022-01-24 ENCOUNTER — HOSPITAL ENCOUNTER (OUTPATIENT)
Dept: PHYSICAL THERAPY | Age: 37
Discharge: HOME | End: 2022-01-24
Payer: OTHER GOVERNMENT

## 2022-01-24 PROCEDURE — 97161 PT EVAL LOW COMPLEX 20 MIN: CPT

## 2022-01-24 PROCEDURE — 97014 ELECTRIC STIMULATION THERAPY: CPT

## 2022-01-24 PROCEDURE — 97110 THERAPEUTIC EXERCISES: CPT

## 2022-01-24 NOTE — PROGRESS NOTES
[facility] AT [address]  Phone: [phone] Fax: [phone] / STATEMENT OF MEDICAL NECESSITY FOR PHYSICAL THERAPY SERVICES  Patient Name: Leonidas Solano : 1985   Medical   Diagnosis: Osteoarthritis of right knee [M17.11] Treatment Diagnosis: R knee pain/ possible patellar sublux    Onset Date: 2021     Referral Source: Mel Trinidad MD Start of Care [facility]): 2022   Prior Hospitalization: See medical history Provider #: [id number]   Prior Level of Function: Functional I, running 1x per week    Comorbidities: None noted    Medications: Verified on Patient Summary List   The Plan of Care and following information is based on the information from the initial evaluation.   ========================================================================  Assessment / key information:  Pt is a 39y.o. year old female who presents with co R knee pain, greater medially, that started after 5 mile run then stair negotiation on 2021. Patient reports \"exruitating\" pain when taking first step on stairs requiring assistance to get up the stairs. Patient denies popping sounds, swelling or bruising. MRI - No acute meniscal or ligamentous injury. Grade 1/2 chondrosis of the central weightbearing medial femoral condyle with mild subchondral edema. Low-grade patellofemoral maltracking with grade 1 patellar chondrosis. Pain ranges from 2 to 10/10 and managed with bracing with working out, Aleve and ice at initial onset/ currently only at night. S/S consistent with possible patellar subluxation. PMH significant for LS and L shoulder pain.    DEFICITS: stairs, incline , working out - running,  , pain after prolonged immobility , rec activities: skiing   Posture:         Hip IR/ tibial ER   Gait:                WFL   ROM : WNL   Strength :  Hip: flexion 5-, ABD 5- , extension 5-  Knee: flexion 5, extension 5, quad lag negative   Core: bridge 100%, plank 30 sec   Flexibility:  hyper flexible into ER   Palpation: medial joint line tenderness   Patella:  Patellar Positioning (Static) squinting   Patellar Tracking lateral      Patellar Mobility hypo      Girth Measurements:  None noted   Balance:  SLS 30 sec     Home exercise program initiated on initial evaluation to address these deficits. Pt would benefit from PT to address these deficits for increased functional mobility and QOL.  ========================================================================  Eval Complexity: History: MEDIUM  Complexity : 1-2 comorbidities / personal factors will impact the outcome/ POC Exam:HIGH Complexity : 4+ Standardized tests and measures addressing body structure, function, activity limitation and / or participation in recreation  Presentation: LOW Complexity : Stable, uncomplicated  Clinical Decision Making:MEDIUM Complexity : FOTO score of 26-74Overall Complexity:LOW   Problem List: pain affecting function, decrease ROM, decrease strength, decrease ADL/ functional abilitiies, decrease activity tolerance, decrease flexibility/ joint mobility, decrease transfer abilities and other FOTO 59/100    Treatment Plan may include any combination of the following: Therapeutic exercise, Therapeutic activities, Neuromuscular re-education, Physical agent/modality, Gait/balance training, Manual therapy, Aquatic therapy, Patient education, Self Care training, Functional mobility training, Home safety training and Stair training  Patient / Family readiness to learn indicated by: asking questions, trying to perform skills and interest  Persons(s) to be included in education: patient (P)  Barriers to Learning/Limitations: None  Measures taken:    Patient Goal (s): \"less pain, confirm dx, be able to workout again \"   Patient self reported health status: good  Rehabilitation Potential: good   Short Term Goals: To be accomplished in  1  weeks:  1.   Pt will be independent and compliant with HEP  Status at last assessment :HEP est at initial eval and will be progress as needed.  Long Term Goals: To be accomplished in  8-12  treatments:  1. Patient will increase FOTO score to 80/100 for indications of increased functional mobility. Status at last assessment : 59/100   2. Patient will demo 5/5 hip ABD strength for improved hip stability with bending and squatting activities   Status at last assessment : 5-  3. Patient will demo 5/5 hip extension strength for improved posterior chain stability for decrease hip IR force with running   Status at last assessment :5  4. Patient will report pain 5/10 at worst for progression to PLOF   Status at last assessment :10/10   Frequency / Duration:   Patient to be seen  2  times per week for 8-12  treatments:   Patient / Caregiver education and instruction: self care, activity modification and exercises  Therapist Signature: Taya Trevino PT Date: 7/99/9032   Certification Period: NA  Time: 8341E   ========================================================================  I certify that the above Physical Therapy Services are being furnished while the patient is under my care. I agree with the treatment plan and certify that this therapy is necessary. Physician Signature:        Date:       Time:                                         Brain Seymour MD  Please sign and return to [facility] or you may fax the signed copy to [phone]. Thank you.

## 2022-01-24 NOTE — PROGRESS NOTES
PT KNEE / HIP EVAL AND TREATMENT    Patient Name: Prisca Alvares  Date:2022  : 1985  [x]  Patient  Verified  Payor: VERNELL / Plan: Servando Roth 74 / Product Type: SANDNES /    In time:1012  Out time:1105  Total Treatment Time (min): 48  Visit #: 1 of 8-12    Treatment Area: Osteoarthritis of right knee [M17.11]    SUBJECTIVE  Pain Level (0-10 scale): (C):4  (B):2  (W):  10  Any medication changes, allergies to medications, diagnosis change, or new procedure performed: see summary sheet for update  Subjective functional status/changes:  CHIEF COMPLAINT: Patient reports with co R knee pain, greater medially, that started after 5 mile run then stair negotiation on 2021. Patient reports \"exruitating\" pain when taking first step on stairs requiring assistance to get up the stairs. Patient denies popping sounds, swelling or bruising. MRI - No acute meniscal or ligamentous injury. Grade 1/2 chondrosis of the central weightbearing medial femoral condyle with mild subchondral edema. Low-grade patellofemoral maltracking with grade 1 patellar chondrosis. Pain ranges from 2 to 10/10 and managed with bracing with working out, Aleve and ice at initial onset/ currently only at night. S/S consistent with possible patellar subluxation. PMH significant for LS and L shoulder pain.      DEFICITS: stairs, incline , working out - running,  , pain after prolonged immobility , rec activities: skiing     OBJECTIVE  Posture: Hip IR/ tibial ER   Gait:  WFL     ROM : WNL     Strength :  Hip: flexion 5-, ABD 5- , extension 5-  Knee: flexion 5, extension 5, quad lag negative   Core: bridge 100%, plank 30 sec     Flexibility:  hyper flexible into ER     Palpation: medial joint line tenderness     Patella:  Patellar Positioning (Static) squinting   Patellar Tracking lateral      Patellar Mobility hypo        Girth Measurements:  None noted   Balance:  SLS 30 sec             Other tests/comments:      Patient Education/ Therapeutic Exercise : [x] Discussed POT including PT expectation, established HEP with pictures and instruction, knee posture, patellar tracking , anatomic posture/ congential posture   (minutes) : 10    Modality (rationale): Promote VMO recruitment to decrease lateral tracking   [x]  E-Stim: type NMES to VMO 5 on 10 off with QS 10 min       Pain Level (0-10 scale) post treatment: 2  ASSESSMENT  [x]  See Plan of Care    PLAN  [x]  Upgrade activities as tolerated     [x] Other:_ POC  2-3 x 8-12    Valdemar Lane, PT 1/24/2022     Justification for Eval Code Complexity:  Patient History : anatomical deficits   Examination see exam   Clinical Presentation: stable   Clinical Decision Making : FOTO : 59 /100

## 2022-01-27 ENCOUNTER — HOSPITAL ENCOUNTER (OUTPATIENT)
Dept: PHYSICAL THERAPY | Age: 37
Discharge: HOME | End: 2022-01-27
Payer: OTHER GOVERNMENT

## 2022-01-27 PROCEDURE — 97110 THERAPEUTIC EXERCISES: CPT

## 2022-01-27 PROCEDURE — 97112 NEUROMUSCULAR REEDUCATION: CPT

## 2022-01-27 PROCEDURE — 97014 ELECTRIC STIMULATION THERAPY: CPT

## 2022-02-04 ENCOUNTER — OFFICE VISIT (OUTPATIENT)
Dept: ORTHOPEDIC SURGERY | Age: 37
End: 2022-02-04
Payer: OTHER GOVERNMENT

## 2022-02-04 VITALS
HEART RATE: 76 BPM | WEIGHT: 158.2 LBS | TEMPERATURE: 98.4 F | OXYGEN SATURATION: 97 % | HEIGHT: 68 IN | BODY MASS INDEX: 23.98 KG/M2

## 2022-02-04 DIAGNOSIS — M17.11 PRIMARY OSTEOARTHRITIS OF RIGHT KNEE: ICD-10-CM

## 2022-02-04 DIAGNOSIS — M22.41 CHONDROMALACIA OF RIGHT PATELLA: ICD-10-CM

## 2022-02-04 DIAGNOSIS — M76.51 PATELLAR TENDINITIS OF RIGHT KNEE: ICD-10-CM

## 2022-02-04 DIAGNOSIS — M25.561 ACUTE PAIN OF RIGHT KNEE: Primary | ICD-10-CM

## 2022-02-04 PROCEDURE — 99203 OFFICE O/P NEW LOW 30 MIN: CPT | Performed by: SPECIALIST

## 2022-02-04 RX ORDER — DICLOFENAC SODIUM 10 MG/G
4 GEL TOPICAL 4 TIMES DAILY
Qty: 5 EACH | Refills: 5 | Status: SHIPPED | OUTPATIENT
Start: 2022-02-04

## 2022-02-07 ENCOUNTER — APPOINTMENT (OUTPATIENT)
Dept: PHYSICAL THERAPY | Age: 37
End: 2022-02-07
Payer: OTHER GOVERNMENT

## 2022-02-09 ENCOUNTER — APPOINTMENT (OUTPATIENT)
Dept: PHYSICAL THERAPY | Age: 37
End: 2022-02-09
Payer: OTHER GOVERNMENT

## 2022-02-14 ENCOUNTER — HOSPITAL ENCOUNTER (OUTPATIENT)
Dept: PHYSICAL THERAPY | Age: 37
Discharge: HOME | End: 2022-02-14
Payer: OTHER GOVERNMENT

## 2022-02-14 PROCEDURE — 97014 ELECTRIC STIMULATION THERAPY: CPT

## 2022-02-14 PROCEDURE — 97140 MANUAL THERAPY 1/> REGIONS: CPT

## 2022-02-14 PROCEDURE — 97110 THERAPEUTIC EXERCISES: CPT

## 2022-02-14 PROCEDURE — 97112 NEUROMUSCULAR REEDUCATION: CPT

## 2022-02-14 NOTE — PROGRESS NOTES
PT DAILY TREATMENT NOTE     Patient Name: La Rosales  Date:2022  : 1985  [x]  Patient  Verified  Payor: VERNELL / Plan: Servando Roth 74 / Product Type:  /    In time:9:17  Out time:10:14  Total Treatment Time (min): 57  Total Timed Codes (min): 47  1:1 Treatment Time (min): 47   Visit #: 3 of     Treatment Area: Pain in right knee [M25.561]    SUBJECTIVE  Pain Level (0-10 scale): 2  Any medication changes, allergies to medications, adverse drug reactions, diagnosis change, or new procedure performed?: [x] No    [] Yes (see summary sheet for update)  Subjective functional status/changes:   [] No changes reported  I think it's better and then it gets worse. I was better and then I raked the backyard. I got a second opinion from an MD. He read the MRI report. He thinks soft tissue irritation can take a while. I got a brace and that helps, I think. I got a topical cream update   Workout update - I get no pain during and then it's a delayed pain by 24 hours.        Modality rationale: decrease edema, decrease inflammation and increase muscle contraction/control to improve the patients ability to improve quad contraction for sport    Min Type Additional Details   10 [x] Estim: []Att   []Unatt  []TENS instruct                 []IFC  []Premod [x]NMES   5\" on 10\" off                     []Other:  []w/US   [x]w/ice   []w/heat  Position: semireclined  Location: R knee with 4 channels to the quad    [x] Skin assessment post-treatment:  [x]intact []redness- no adverse reaction       []redness - adverse reaction:       19 min Therapeutic Exercise:  [x] See flow sheet :   Rationale: increase ROM and increase strength to improve the patients ability to walk, stand, workout     8 min Manual Therapy:  IASTM to distal quad, adductors and ITB    Rationale: decrease pain, increase ROM, increase tissue extensibility and decrease trigger points to improve pain, ease of mobility   The manual therapy interventions were performed at a separate and distinct time from the therapeutic activities interventions   (na Add 59 Modifier in conjunction with TA treatment)    20 min Neuromuscular Re-education:  []  See flow sheet :   Rationale: improve coordination, improve balance and increase proprioception  to improve the patients ability to restore quad control for ADLs, self-care, sport          x min Patient Education: [x] Review HEP    [] Progressed/Changed HEP based on:   HEP progression  UE and core strength  Use of brace  [] positioning   [] body mechanics   [] transfers   [] heat/ice application        Other Objective/Functional Measures:     Pt appears to have a brace with medial and lateral knee support, but not a patellar j brace. Discussed brace differences    Pain Level (0-10 scale) post treatment: 0-1    ASSESSMENT/Changes in Function: pt with slow progress with pain, however she has only been see for 3 sessions (including IE) and had COVID in her household last week so she was laying low and not participating in her HEP. Pt notes min c/o \"weakness\" in the quad today and only min c/o pain with standing lateral and front walking. No obvious patellar maltracking today. Glute max weakness/fatigue with TE. Patient will continue to benefit from skilled PT services to modify and progress therapeutic interventions, address functional mobility deficits, address ROM deficits, address strength deficits, analyze and address soft tissue restrictions, analyze and cue movement patterns, analyze and modify body mechanics/ergonomics, assess and modify postural abnormalities, address imbalance/dizziness and instruct in home and community integration to attain remaining goals. []  See Plan of Care  []  See progress note/recertification  []  See Discharge Summary         Progress towards goals / Updated goals:   · Short Term Goals: To be accomplished in  1  weeks:  1.  Pt will be independent and compliant with HEP  Status at last assessment :HEP est at initial eval and will be progress as needed. Current: notes compliance and no issues (2/14/22)  · Long Term Goals: To be accomplished in  8-12  treatments:  1.  Patient will increase FOTO score to 80/100 for indications of increased functional mobility.    Status at last assessment : 59/100   2.  Patient will demo 5/5 hip ABD strength for improved hip stability with bending and squatting activities   Status at last assessment : 5-  Current; progressed TE today (2/14/22)  3. Patient will demo 5/5 hip extension strength for improved posterior chain stability for decrease hip IR force with running   Status at last assessment :5  Current: progressed TE today (2/14/22)  4. Patient will report pain 5/10 at worst for progression to PLOF   Status at last assessment :10/10   Current: max is less than 10/10 (1/27/22)    PLAN  [x]  Upgrade activities as tolerated     [x]  Continue plan of care  []  Update interventions per flow sheet       []  Discharge due to:_  [x]  Other:_  Assess response to HEP, use of brace     Lorenzo Latif PT 2/14/2022  8:14 AM    Future Appointments   Date Time Provider Joanne Xiao   2/14/2022  9:15 AM Atif Noonan PT [facility]   2/16/2022 10:00 AM Aniceto Ledbetter [facility]   2/21/2022  9:15 AM Atif Noonan PT [facility]   2/23/2022 10:00 AM Aniceto Ledbetter [facility]   2/28/2022  9:15 AM Atif Noonan PT [facility]   3/2/2022  9:15 AM Aniceto Ledbetter [facility]

## 2022-02-16 ENCOUNTER — HOSPITAL ENCOUNTER (OUTPATIENT)
Dept: PHYSICAL THERAPY | Age: 37
Discharge: HOME | End: 2022-02-16
Payer: OTHER GOVERNMENT

## 2022-02-16 PROCEDURE — 97110 THERAPEUTIC EXERCISES: CPT

## 2022-02-16 PROCEDURE — 97014 ELECTRIC STIMULATION THERAPY: CPT

## 2022-02-16 PROCEDURE — 97112 NEUROMUSCULAR REEDUCATION: CPT

## 2022-02-16 NOTE — PROGRESS NOTES
PT DAILY TREATMENT NOTE     Patient Name: Jose Enrique Dutta  Date:2022  : 1985  [x]  Patient  Verified  Payor: VERNELL / Plan: Servando Roth 74 / Product Type: Yennifer Holbrook /    In time:10:00  Out time:11:01  Total Treatment Time (min): 61  Visit #: 4 of     Medicare/[employer] Only   Total Timed Codes (min):  46 1:1 Treatment Time:  46       Treatment Area: Pain in right knee [M25.561]    SUBJECTIVE  Pain Level (0-10 scale): 0  Any medication changes, allergies to medications, adverse drug reactions, diagnosis change, or new procedure performed?: [x] No    [] Yes (see summary sheet for update)  Subjective functional status/changes:   [] No changes reported  \"Not having too much pain today, it has been feeling better. \"    OBJECTIVE    Modality rationale: increase muscle contraction/control to improve the patients ability to relax and sleep following therapy session to improve restorative sleep patterns.     Min Type Additional Details    [x] Estim:  []Unatt       []IFC  []Premod                        []Other:  []w/ice   []w/heat  Position:   Location:    10 + 5 set up [x] Estim: []Att    []TENS instruct  [x]NMES                    []Other: 2\" ramp, 10\" on/10\" off  []w/US   [x]w/ice   []w/heat  Position: long sitting  Location: right quadriceps, emphasis over VMO    []  Traction: [] Cervical       []Lumbar                       [] Prone          []Supine                       []Intermittent   []Continuous Lbs:  [] before manual  [] after manual    []  Ultrasound: []Continuous   [] Pulsed                           []1MHz   []3MHz W/cm2:  Location:    []  Iontophoresis with dexamethasone         Location: [] Take home patch   [] In clinic    []  Ice     []  heat  []  Ice massage  []  Laser   []  Anodyne Position:   Location:     []  Laser with stim  []  Other:  Position:  Location:    []  Vasopneumatic Device    []  Right     []  Left  Pre-treatment girth:  Post-treatment girth:  Measured at (location):  Pressure:       [] lo [] med [] hi   Temperature: [] lo [] med [] hi   [x] Skin assessment post-treatment:  [x]intact []redness- no adverse reaction    []redness - adverse reaction:       31 min Therapeutic Exercise:  [x] See flow sheet :   Rationale: increase ROM and increase strength to improve the patients ability to perform gait and transfers with improved LE strength and mobility. 15 min Neuromuscular Re-education:  [x]  See flow sheet :   Rationale: increase strength, improve coordination, improve balance and increase proprioception  to improve the patients ability to perform stair negotiation and functional mobility in the home/community with improved quadriceps control and decreased falls risk. With   [x] TE   [] TA   [x] neuro   [] other: Patient Education: [x] Review HEP    [] Progressed/Changed HEP based on:   [] positioning   [] body mechanics   [] transfers   [] heat/ice application    [] other:      Other Objective/Functional Measures: -Added BOSU lunges and lateral step downs     Pain Level (0-10 scale) post treatment: 0    ASSESSMENT/Changes in Function: Added increased standing neuromuscular control interventions to help pt develop improved knee extensor eccentric control. Good medial quadriceps activation observed with NMES today. Pt reports slight increased in pinching sensation with full right knee extension, especially in weight bearing, which could be explained by approximation of medial weight bearing joint surfaces with extension. Patient will continue to benefit from skilled PT services to modify and progress therapeutic interventions, address functional mobility deficits, address ROM deficits, address strength deficits, analyze and address soft tissue restrictions, analyze and cue movement patterns, analyze and modify body mechanics/ergonomics, assess and modify postural abnormalities and address imbalance/dizziness to attain remaining goals.      []  See Plan of Care  []  See progress note/recertification  []  See Discharge Summary         Progress towards goals / Updated goals: · Short Term Goals: To be accomplished in  1  weeks:  1.  Pt will be independent and compliant with HEP  Status at last assessment :HEP est at initial eval and will be progress as needed.   Current: notes compliance and no issues (2/14/22)  · Long Term Goals: To be accomplished in  8-12  treatments:  1.  Patient will increase FOTO score to 80/100 for indications of increased functional mobility.    Status at last assessment : 59/100   2.  Patient will demo 5/5 hip ABD strength for improved hip stability with bending and squatting activities   Status at last assessment : 5-  Current; progressed TE today (2/14/22)  3. Patient will demo 5/5 hip extension strength for improved posterior chain stability for decrease hip IR force with running   Status at last assessment :5  Current: progressed TE today (2/14/22)  4. Patient will report pain 5/10 at worst for progression to PLOF   Status at last assessment :10/10   Current: max is less than 10/10 (1/27/22)    PLAN  [x]  Upgrade activities as tolerated     [x]  Continue plan of care  []  Update interventions per flow sheet       []  Discharge due to:_  []  Other:_      Daphney Ferguson 2/16/2022  9:09 AM    Future Appointments   Date Time Provider Joanne Xiao   2/16/2022 10:00 AM Wendy Marrufo [facility]   2/21/2022  9:15 AM Sorin Elaine, PT [facility]   2/23/2022 10:00 AM Wendy Marrufo [facility]   2/28/2022  9:15 AM Sorin Elaine, PT [facility]   3/2/2022  9:15 AM Rujazmin Marrufo [facility]

## 2022-02-21 ENCOUNTER — HOSPITAL ENCOUNTER (OUTPATIENT)
Dept: PHYSICAL THERAPY | Age: 37
Discharge: HOME | End: 2022-02-21
Payer: OTHER GOVERNMENT

## 2022-02-21 PROCEDURE — 97112 NEUROMUSCULAR REEDUCATION: CPT

## 2022-02-21 PROCEDURE — 97110 THERAPEUTIC EXERCISES: CPT

## 2022-02-21 PROCEDURE — 97014 ELECTRIC STIMULATION THERAPY: CPT

## 2022-02-21 NOTE — PROGRESS NOTES
PT DAILY TREATMENT NOTE     Patient Name: Prisca Alvares  Date:2022  : 1985  [x]  Patient  Verified  Payor:  / Plan: Freddie Branham / Product Type:  /    In time:9:16  Out time:10:12  Total Treatment Time (min): 56  Total Timed Codes (min): 43  1:1 Treatment Time (min): 43   Visit #: 5 of     Treatment Area: Pain in right knee [M25.561]    SUBJECTIVE  Pain Level (0-10 scale): 2  Any medication changes, allergies to medications, adverse drug reactions, diagnosis change, or new procedure performed?: [x] No    [] Yes (see summary sheet for update)  Subjective functional status/changes:   [] No changes reported  Less of the sharp pains. Less pain in general. Doing workouts, UE and core mostly. And I'm not having the rebound pain afterwards. Even after last Thursday I did more and it was ok.  I was sore  - good mm soreness for the first time in 2 months,     OBJECTIVE  Modality rationale: increase muscle contraction/control to improve the patients ability to perform sport    Min Type Additional Details   10 + 3 setup  [x] Estim: []Att   [x]Unatt  []TENS instruct                 []IFC  []Premod [x]NMES  2\" ramp, 10\" on, 10\" off                       []Other:  []w/US   []w/ice   []w/heat  Position: sit to stand with TKE  Location: R quad with VMO focus    [x] Skin assessment post-treatment:  [x]intact []redness- no adverse reaction       []redness - adverse reaction:       33 min Therapeutic Exercise:  [x] See flow sheet :  Prone hip ext changed to bent over   Added triple threat bridges on SB (green)    Rationale: increase ROM and increase strength to improve the patients ability to restore sport, stairs, squatting        10 min Neuromuscular Re-education:  []  See flow sheet :     Added steamboats 3 way each leg to progress to dynamic movements in standing   Added marching to SB squats on wall to challenge SL stance and stability    plyometrics - quick feet 3x30\" Rationale: improve coordination, improve balance and increase proprioception  to improve the patients ability to restore sport progression           x min Patient Education: [x] Review HEP    [] Progressed/Changed HEP based on:   [] positioning   [] body mechanics   [] transfers   [] heat/ice application        Other Objective/Functional Measures:   Min c/o pain at the lateral patella with retro squat walking   FOTO 63/100     Pain Level (0-10 scale) post treaitment: 0    ASSESSMENT/Changes in Function: Challenged by eccentric control required for lateral step downs. benefits from use of a mirror to assess form. Min c/o pain with returning to stand, but pain is fleeing and eases off. Progressed to FOTO goal.     Patient will continue to benefit from skilled PT services to modify and progress therapeutic interventions, address functional mobility deficits, address ROM deficits, address strength deficits, analyze and address soft tissue restrictions, analyze and cue movement patterns, analyze and modify body mechanics/ergonomics, assess and modify postural abnormalities, address imbalance/dizziness and instruct in home and community integration to attain remaining goals. []  See Plan of Care  []  See progress note/recertification  []  See Discharge Summary         Progress towards goals / Updated goals: · Short Term Goals: To be accomplished in  1  weeks:  1.  Pt will be independent and compliant with HEP  Status at last assessment :HEP est at initial eval and will be progress as needed.   Current: notes compliance and no issues (2/14/22)  · Long Term Goals: To be accomplished in  8-12  treatments:  1.  Patient will increase FOTO score to 80/100 for indications of increased functional mobility.    Status at last assessment : 59/100   Current: progressing at 63/100 (2/21/22)   2.  Patient will demo 5/5 hip ABD strength for improved hip stability with bending and squatting activities   Status at last assessment Valeriy Negron-  Current; progressed TE today (2/21/22)  3. Patient will demo 5/5 hip extension strength for improved posterior chain stability for decrease hip IR force with running   Status at last assessment :5  Current: progressed TE today (2/21/22)  4. Patient will report pain 5/10 at worst for progression to PLOF   Status at last assessment :10/10   Current: less c/o intense pain (2/21/22)    PLAN  [x]  Upgrade activities as tolerated     [x]  Continue plan of care  []  Update interventions per flow sheet       []  Discharge due to:_  [x]  Other:_  PN due NV   Progress plyometrics as tolerated     Tesfaye Mancera, PT 2/21/2022  7:22 AM    Future Appointments   Date Time Provider Joanne Xiao   2/21/2022  9:15 AM Andre Getting, PT [facility]   2/23/2022 10:00 AM Manjula Lou [facility]   2/28/2022  9:15 AM Andre Getting, PT [facility]   3/2/2022  9:15 AM Manjula Lou [facility]

## 2022-02-23 ENCOUNTER — HOSPITAL ENCOUNTER (OUTPATIENT)
Dept: PHYSICAL THERAPY | Age: 37
Discharge: HOME | End: 2022-02-23
Payer: OTHER GOVERNMENT

## 2022-02-23 PROCEDURE — 97530 THERAPEUTIC ACTIVITIES: CPT

## 2022-02-23 PROCEDURE — 97110 THERAPEUTIC EXERCISES: CPT

## 2022-02-23 NOTE — PROGRESS NOTES
[facility]   [id number] [place] [address] [address] [place] [address]  Phone: [phone] Fax: [phone]  PROGRESS NOTE  Patient Name: Mayra Allen : 1985   Treatment/Medical Diagnosis: Pain in right knee [M25.561]   Referral Source: Gus Solorio MD     Date of Initial Visit: 22 Attended Visits: 6 Missed Visits: 1     SUMMARY OF TREATMENT  Pt is a 39y.o. year old female who presents with co R knee pain, greater medially, that started after 5 mile run then stair negotiation on 2021. Treatment has consisted of: therapeutic exercise to improve LE/lumbar strength/mobility; therapeutic activities to improve transfer/lift/carry ability; neuromuscular re-education to improve balance, core stability, neuromuscular control with lifting; physical agent/modality for symptom management; manual therapy for symptom relief and muscle flexibility; patient education to improve symptom management; self Care training; home safety training; stair training, and functional mobility training. CURRENT STATUS  Patient has attended PT for 6 sessions for the treatment of right knee pain. The patient demonstrates moderate progress at this time, demonstrating good progress toward all therapy goals although progress has not been slower than patient desires. She demonstrates full and improved hip/knee extension strength in all planes. She has improved her ease with stair negotiation although squatting remains more painful and difficult. She demonstrates slight hypermobility of right patellar lateral glide and report increased TTP along medial aspect of femoral condyle indicating potential MPFL involvement. Will continue to treat/progress according to patellofemoral pain guidelines.  Additional assessment includes:  Subjective Gains: decreased overall pain levels, improved hip/knee strength, improvement in ease with stair negotiation  Subjective Deficits: continued pain with squatting, difficulty lifting children   % improvement: 20-30%  Pain   Average: 2-3/10                     Best: 0/10                   Worst: 5-6/10  Patient Goal: \"get back to all activity\"    Objective Measures:   Knee AAROM:  Right Knee: WFL, right patellar mobility increased laterally compared to left with some discomfort  Left Knee: [facility]     LE Strength:    Right (/5) Left (/5)   Hip     Flexion 5 5             Abduction 5 5             Extension 5 5   Knee   Extension 5 (mild pain) 5              Flexion 5 5   Ankle   Dorsiflexion 5 5      Gait: unremarkable  Functional Squat: squatting knee flexion angle 120 deg, still inclined to place more weight through left LE at deep squat, increased pain with deep squat  Stair Negotiation: continued pain with initiating step ups, increased right valgus collapse with step downs  Balance: SLS 30\" B    Current Goals: · Short Term Goals: To be accomplished in  1  weeks:  1.  Pt will be independent and compliant with HEP  Status at last assessment :HEP est at initial eval and will be progress as needed.   Current: notes compliance and no issues (2/14/22)  · Long Term Goals: To be accomplished in  8-12  treatments:  1.  Patient will increase FOTO score to 80/100 for indications of increased functional mobility. Status at last assessment : 59/100   Current: progressing at 63/100 (2/21/22)   2.  Patient will demo 5/5 hip ABD strength for improved hip stability with bending and squatting activities   Status at last assessment : 5-  Current: met, 5/5 (2/23/22)  3. Patient will demo 5/5 hip extension strength for improved posterior chain stability for decrease hip IR force with running   Status at last assessment :5  Current: met, 5/5 (2/23/22)  4. Patient will report pain 5/10 at worst for progression to PLOF   Status at last assessment :10/10   Current: progressing, 5-6/10 pain at worst (2/23/22)      New Goals to be achieved in __8__  treatments:  1.  Patient will increase FOTO score to 80/100 for indications of increased functional mobility. Status at last assessment : 63/100   2.  Patient will demo ability to complete forward step down from 6\" step without increased valgus collapse or pain to improve ease with stair negotiation. Status at last assessment : increased valgus collapse with lateral step downs from 4\" height  3. Patient will jog for 5 minutes at desired pace without increased knee pain to improve ability to return to PLOF recreation activities.   Status at last assessment: has not resumed jogging/plyometrics at this time  4. Patient will report pain 3/10 at worst for progression to PLOF   Status at last assessment : 5-6/10 pain at worst     RECOMMENDATIONS  Pt to continue with skilled therapy for 1-2x/week for 8 sessions to improve ease with squatting, lifting children, negotiating stairs, and returning to running for recreation. .    If you have any questions/comments please contact us directly at [phone]   Thank you for allowing us to assist in the care of your patient. Therapist Signature: Ruddy Dalton Date: 2/23/2022   Reporting Period  1/24/22-2/23/22 Time: 2:29 PM   NOTE TO PHYSICIAN:  PLEASE COMPLETE THE ORDERS BELOW AND FAX TO   [facility]: [phone]. If you are unable to process this request in 24 hours please contact our office: [phone].  ___ I have read the above report and request that my patient continue as recommended.   ___ I have read the above report and request that my patient continue therapy with the following changes/special instructions:_________________________________________________________   ___ I have read the above report and request that my patient be discharged from therapy.      Physician Signature:        Date:       Time:                                                        Franc Almanzar MD.

## 2022-02-23 NOTE — PROGRESS NOTES
PT DAILY TREATMENT NOTE     Patient Name: Prisca Alvares  Date:2022  : 1985  [x]  Patient  Verified  Payor: VERNELL / Plan: Servando Roth 74 / Product Type:  /    In time:10:00  Out time:10:59  Total Treatment Time (min): 61  Visit #: 6 of     Medicare/[facility] Only   Total Timed Codes (min):   1:1 Treatment Time:         Treatment Area: Pain in right knee [M25.561]    SUBJECTIVE  Pain Level (0-10 scale): 3-4  Any medication changes, allergies to medications, adverse drug reactions, diagnosis change, or new procedure performed?: [x] No    [] Yes (see summary sheet for update)  Subjective functional status/changes:   [] No changes reported  \"I feel like I am slowly getting better, just more slowly than I would like to be progressing. \"    OBJECTIVE    35 min Therapeutic Exercise:  [x] See flow sheet :   Rationale: increase ROM and increase strength to improve the patients ability to perform gait and transfers with improved LE strength and mobility. 34 min Therapeutic Activity:  [x]  See flow sheet :   Rationale: increase strength, improve coordination, improve balance, and increase proprioception  to improve the patients ability to perform transfers, stair negotiation, and floor <> waist lifts.   Patient education: progress toward goals, reassessing LE function, updated HEP to include banded squats, patellar instability vs medial joint compression pain, weaning away from brace             With   [x] TE   [x] TA   [] neuro   [] other: Patient Education: [x] Review HEP    [] Progressed/Changed HEP based on:   [] positioning   [] body mechanics   [] transfers   [] heat/ice application    [] other:      Other Objective/Functional Measures:   Knee AAROM:  Right Knee: WFL, right patellar mobility increased laterally compared to left with some discomfort  Left Knee: [facility]    LE Strength:   Right (/5) Left (/5)   Hip     Flexion 5 5             Abduction 5 5             Extension 5 5   Knee   Extension 5 (mild pain) 5              Flexion 5 5   Ankle   Dorsiflexion 5 5     Gait: unremarkable  Functional Squat: squatting knee flexion angle 120 deg, still inclined to place more weight through left LE at deep squat, increased pain with deep squat  Stair Negotiation: continued pain with initiating step ups, increased right valgus collapse with step downs  Balance: SLS 30\" B       Pain Level (0-10 scale) post treatment: 0    ASSESSMENT/Changes in Function:   Patient has attended PT for 6 sessions for the treatment of right knee pain. The patient demonstrates moderate progress at this time, demonstrating good progress toward all therapy goals although progress has not been slower than patient desires. She demonstrates full and improved hip/knee extension strength in all planes. She has improved her ease with stair negotiation although squatting remains more painful and difficult. She demonstrates slight hypermobility of right patellar lateral glide and report increased TTP along medial aspect of femoral condyle indicating potential MPFL involvement. Will continue to treat/progress according to patellofemoral pain guidelines. Additional assessment includes:  Subjective Gains: decreased overall pain levels, improved hip/knee strength, improvement in ease with stair negotiation  Subjective Deficits: continued pain with squatting, difficulty lifting children   % improvement: 20-30%  Pain   Average: 2-3/10       Best: 0/10     Worst: 5-6/10  Patient Goal: \"get back to all activity\"      Patient will continue to benefit from skilled PT services to modify and progress therapeutic interventions, address functional mobility deficits, address ROM deficits, address strength deficits, analyze and address soft tissue restrictions, analyze and cue movement patterns, analyze and modify body mechanics/ergonomics, assess and modify postural abnormalities and address imbalance/dizziness to attain remaining goals. []  See Plan of Care  [x]  See progress note/recertification  []  See Discharge Summary         Progress towards goals / Updated goals: · Short Term Goals: To be accomplished in  1  weeks:  1.  Pt will be independent and compliant with HEP  Status at last assessment :HEP est at initial eval and will be progress as needed.   Current: notes compliance and no issues (2/14/22)  · Long Term Goals: To be accomplished in  8-12  treatments:  1.  Patient will increase FOTO score to 80/100 for indications of increased functional mobility.    Status at last assessment : 59/100   Current: progressing at 63/100 (2/21/22)   2.  Patient will demo 5/5 hip ABD strength for improved hip stability with bending and squatting activities   Status at last assessment : 5-  Current: met, 5/5 (2/23/22)  3. Patient will demo 5/5 hip extension strength for improved posterior chain stability for decrease hip IR force with running   Status at last assessment :5  Current: met, 5/5 (2/23/22)  4. Patient will report pain 5/10 at worst for progression to PLOF   Status at last assessment :10/10   Current: progressing, 5-6/10 pain at worst (2/23/22)    PLAN  [x]  Upgrade activities as tolerated     [x]  Continue plan of care  []  Update interventions per flow sheet       []  Discharge due to:_  []  Other:_      Deyvi Blanco 2/23/2022  9:06 AM    Future Appointments   Date Time Provider Joanne Xiao   2/23/2022 10:00 AM Autumn Milks [facility]   2/28/2022  9:15 AM Cely Flowers PT [facility]   3/2/2022  9:15 AM Autumn Milks [facility]

## 2022-02-28 ENCOUNTER — HOSPITAL ENCOUNTER (OUTPATIENT)
Dept: PHYSICAL THERAPY | Age: 37
Discharge: HOME | End: 2022-02-28
Payer: OTHER GOVERNMENT

## 2022-02-28 PROCEDURE — 97110 THERAPEUTIC EXERCISES: CPT

## 2022-02-28 PROCEDURE — 97112 NEUROMUSCULAR REEDUCATION: CPT

## 2022-02-28 NOTE — PROGRESS NOTES
PT DAILY TREATMENT NOTE 8-    Patient Name: Suann Runner  Date:2022  : 1985  [x]  Patient  Verified  Payor: VERNELL / Plan: Servando Roth 74 / Product Type:  /    In time:9:19  Out time:10:00  Total Treatment Time (min): 41  Total Timed Codes (min): 41  1:1 Treatment Time (min): 41   Visit #: 1 of 8    Treatment Area: Pain in right knee [M25.561]    SUBJECTIVE  Pain Level (0-10 scale): 1  Any medication changes, allergies to medications, adverse drug reactions, diagnosis change, or new procedure performed?: [x] No    [] Yes (see summary sheet for update)  Subjective functional status/changes:   [] No changes reported  i'm doing well! No use of brace in last 2 days for typically life, just working out. Did a leg workout last week - Saturday - and no adverse effects. Included weighted lunges and squats. Doing stairs with no issues. At the end of the night, that is my gauge with how well I can do stairs. I can fully extend the leg with no issues. I notice that it's clicking more - but that it feels good most of the time.      Pt goal: return to running 1x/week 4-5 miles     OBJECTIVE    16 min Therapeutic Exercise:  [x] See flow sheet :    Increased lateral step downs to 6\"  Triple threat bridge:   Changed to Single leg HS eccentric slide out (supine with slider on the parallel bar surface)    Rationale: increase ROM and increase strength to improve the patients ability to walk, stairs, workout, run      25 min Neuromuscular Re-education:  [x]  See flow sheet :    Plyometrics:   Mountain climbers: 2x30\"   Line jumps forward/ lateral: 30\" each  Fast feet with lateral tap outs 40\" , R LE tap outs  [employer] step jump downs: level 1 (step only): 10 x; 2 foot to 2 foot  2 foot to 2 foot: jump up        Added bosu knee drives (step up with a knee drive on the opposite leg - holding 2-3\" per position)    Rationale: improve coordination, improve balance and increase proprioception to improve the patients ability to restore running, jumping, ADLs, sport             x min Patient Education: [x] Review HEP    [] Progressed/Changed HEP based on:   Pt education on joint cavitations  Workout modifications and progressions for her [employer] workout programs at home. Discussed progressive load of the knee per anterior knee pain, Patellofemoral  Pain program   D/C NMES as pt progressing well. HEP: 3x30 contacts for (B) jumps in place, jumps front/back and lateral     [] positioning   [] body mechanics   [] transfers   [] heat/ice application        Other Objective/Functional Measures:     Pain Level (0-10 scale) post treatment: 0    ASSESSMENT/Changes in Function: pt making great progress towards all goals. Increased tolerance to depth of squats, added load, plyometrics and able to perform whole program without brace today. Min c/o knee irritation but not described as pain. Demo's improved mechanics for movement and progression towards full return to function and sport. Patient will continue to benefit from skilled PT services to modify and progress therapeutic interventions, address functional mobility deficits, address ROM deficits, address strength deficits, analyze and address soft tissue restrictions, analyze and cue movement patterns, analyze and modify body mechanics/ergonomics, assess and modify postural abnormalities, address imbalance/dizziness and instruct in home and community integration to attain remaining goals. []  See Plan of Care  []  See progress note/recertification  []  See Discharge Summary         Progress towards goals / Updated goals:  1.  Patient will increase FOTO score to 80/100 for indications of increased functional mobility.    Status at last assessment : 63/100  Current: progressing with pt noting improved function; D/C use of brace for ADLs and today's treatment (2/28/22)   2.  Patient will demo ability to complete forward step down from 6\" step without increased valgus collapse or pain to improve ease with stair negotiation.   Status at last assessment : increased valgus collapse with lateral step downs from 4\" height  Current: goal progressing to 6\" with good form (2/28/22)   3. Patient will jog for 5 minutes at desired pace without increased knee pain to improve ability to return to PLOF recreation activities.   Status at last assessment: has not resumed jogging/plyometrics at this time  Current: progressed plyometrics (2/28/22)   4. Patient will report pain 3/10 at worst for progression to PLOF   Status at last assessment : 5-6/10 pain at worst     NEXT PN DUE 3/23/22    PLAN  [x]  Upgrade activities as tolerated     [x]  Continue plan of care  []  Update interventions per flow sheet       []  Discharge due to:_  [x]  Other:_  con't to progress program and load on the knee  Progress plyometrics to some single leg or height on boxy jumps to tolerance NV     Naomi Sullivan PT 2/28/2022  8:02 AM    Future Appointments   Date Time Provider Joanne Xiao   2/28/2022  9:15 AM Lex Medina PT [facility]   3/2/2022  9:15 AM Cindy Licona [facility]   3/9/2022 11:30 AM Zoe Prieto PTA [facility]   3/16/2022 11:30 AM Cindy Licona [facility]   3/21/2022  9:15 AM Lex Medina PT [facility]   3/28/2022  9:15 AM Lex Medina PT [facility]

## 2022-03-02 ENCOUNTER — HOSPITAL ENCOUNTER (OUTPATIENT)
Dept: PHYSICAL THERAPY | Age: 37
Discharge: HOME | End: 2022-03-02
Payer: OTHER GOVERNMENT

## 2022-03-02 PROCEDURE — 97112 NEUROMUSCULAR REEDUCATION: CPT

## 2022-03-02 PROCEDURE — 97110 THERAPEUTIC EXERCISES: CPT

## 2022-03-02 NOTE — PROGRESS NOTES
PT DAILY TREATMENT NOTE     Patient Name: La Rosales  Date:3/2/2022  : 1985  [x]  Patient  Verified  Payor: VERNELL / Plan: Servando Roth 74 / Product Type:  /    In time:9:36  Out time:10:15  Total Treatment Time (min): 39  Visit #: 2 of 8    Medicare/BCBS Only   Total Timed Codes (min):   1:1 Treatment Time:         Treatment Area: Pain in right knee [M25.561]    SUBJECTIVE  Pain Level (0-10 scale): 2-3  Any medication changes, allergies to medications, adverse drug reactions, diagnosis change, or new procedure performed?: [x] No    [] Yes (see summary sheet for update)  Subjective functional status/changes:   [] No changes reported  \"Doing a little worse than Monday but I did play some light soccer with my kids the other day so that may explain it. \"    OBJECTIVE    14 min Therapeutic Exercise:  [x] See flow sheet :   Rationale: increase ROM and increase strength to improve the patients ability to perform gait and transfers with improved LE strength and mobility. 25 min Neuromuscular Re-education:  [x]  See flow sheet : 2 sets of 30 repetitions of each double leg jump (in place, lateral, fwd/back), included gentle lateral bounding with single leg and deeper squat jumps    Rationale: increase strength, improve coordination, improve balance and increase proprioception  to improve the patients ability to perform stair negotiation and functional mobility in the home/community with improved quadriceps control and decreased falls risk.        With   [x] TE   [] TA   [x] neuro   [] other: Patient Education: [x] Review HEP    [] Progressed/Changed HEP based on:   [] positioning   [] body mechanics   [] transfers   [] heat/ice application    [] other:      Other Objective/Functional Measures: -Shortened session due to late arrival  -All interventions performed without knee brace today     Pain Level (0-10 scale) post treatment: 0    ASSESSMENT/Changes in Function: Progressed squatting intervention to decline squats with upright trunk posture to increased quadriceps activation. Provided verbal cuing to avoid valgus collapse with wide stance squat jumps. Good tolerance for increased plyometrics. Patient will continue to benefit from skilled PT services to modify and progress therapeutic interventions, address functional mobility deficits, address ROM deficits, address strength deficits, analyze and address soft tissue restrictions, analyze and cue movement patterns, analyze and modify body mechanics/ergonomics, assess and modify postural abnormalities and address imbalance/dizziness to attain remaining goals. []  See Plan of Care  []  See progress note/recertification  []  See Discharge Summary         Progress towards goals / Updated goals:  1.  Patient will increase FOTO score to 80/100 for indications of increased functional mobility. Status at last assessment : 63/100  Current: progressing with pt noting improved function; D/C use of brace for ADLs and today's treatment (2/28/22)   2.  Patient will demo ability to complete forward step down from 6\" step without increased valgus collapse or pain to improve ease with stair negotiation.   Status at last assessment : increased valgus collapse with lateral step downs from 4\" height  Current: goal progressing to 6\" with good form (2/28/22)   3. Patient will jog for 5 minutes at desired pace without increased knee pain to improve ability to return to PLOF recreation activities.   Status at last assessment: has not resumed jogging/plyometrics at this time  Current: progressed plyometrics (2/28/22)   4. Patient will report pain 3/10 at worst for progression to PLOF   Status at last assessment : 5-6/10 pain at worst      NEXT PN DUE 3/23/22    PLAN  [x]  Upgrade activities as tolerated     [x]  Continue plan of care  []  Update interventions per flow sheet       []  Discharge due to:_  []  Other:_      Millycoby Oconnor 3/2/2022  9:07 AM    Future Appointments   Date Time Provider Joanne Xiao   3/2/2022  9:15 AM Skip Landa [facility]   3/9/2022 11:30 AM Lisandra Razo [facility]   3/16/2022 11:30 AM Skip Landa [facility]   3/21/2022  9:15 AM Rehana Rosado, PT [facility]   3/28/2022  9:15 AM Rehana Rosado, PT [facility]

## 2022-03-09 ENCOUNTER — HOSPITAL ENCOUNTER (OUTPATIENT)
Dept: PHYSICAL THERAPY | Age: 37
Discharge: HOME | End: 2022-03-09
Payer: OTHER GOVERNMENT

## 2022-03-09 PROCEDURE — 97110 THERAPEUTIC EXERCISES: CPT

## 2022-03-09 PROCEDURE — 97140 MANUAL THERAPY 1/> REGIONS: CPT

## 2022-03-09 PROCEDURE — 97112 NEUROMUSCULAR REEDUCATION: CPT

## 2022-03-09 NOTE — PROGRESS NOTES
PT DAILY TREATMENT NOTE     Patient Name: Maikol Cruz  Date:3/9/2022  : 1985  [x]  Patient  Verified  Payor: VERNELL / Plan: Servando Roth 74 / Product Type:  /    In time: 11:31 am             Out time: 12:15 pm  Total Treatment Time (min): 44  Visit #: 3 of 8    Medicare/BCBS Only   Total Timed Codes (min):   1:1 Treatment Time:         Treatment Area: Pain in right knee [M25.561]    SUBJECTIVE  Pain Level (0-10 scale): 1-2  Any medication changes, allergies to medications, adverse drug reactions, diagnosis change, or new procedure performed?: [x] No    [] Yes (see summary sheet for update)  Subjective functional status/changes:   [] No changes reported  Patient reports having increased joint line pain after last session, but has been icing and using her massage gun which has helped. She reports intent to schedule a f/u with her referring surgeon and is considering the scope that was mentioned at their previous appointment. Notes having difficulty feeling fatigue in the inner thigh muscle with the squats at home and in the gym. OBJECTIVE    19 min Therapeutic Exercise:  [x] See flow sheet:    Rationale: increase ROM and increase strength to improve the patients ability to perform gait and transfers with improved LE strength and mobility. 16 min Neuromuscular Re-education:  [x]  See flow sheet: reinitiate VMO bridges and SAQ (in hook-lying) as per patient request   Rationale: increase strength, improve coordination, improve balance and increase proprioception  to improve the patients ability to perform stair negotiation and functional mobility in the home/community with improved quadriceps control and decreased falls risk. 9 min Manual Therapy:  (R) TCJ and midfoot mobs grade III/IV; SI check - possible (R) inflare - attempted MET correction without improvement   Rationale: increase ROM to improve the patients ability to perform ADLs.  The manual therapy interventions were performed at a separate and distinct time from the therapeutic activities interventions. With   [x] TE   [] TA   [x] neuro   [] other: Patient Education: [x] Review HEP - open books, modify bridges to increase glut activation     Other Objective/Functional Measures:  -All interventions performed without knee brace today  Bridge 75% AROM with proper form    Pain Level (0-10 scale) post treatment: 0    ASSESSMENT/Changes in Function:   Patient cont's with joint line pain with any concentric quadriceps recruitment in standing (weight-bearing) as opposed to supine/sitting. Patient demos good palpable VM contraction with all QS attempts, although does demonstrate decreased VMO tone in comparison to the (L) LE possibly affecting screw-home mechanism and patellar tracking for TKE. Patient will continue to benefit from skilled PT services to modify and progress therapeutic interventions, address functional mobility deficits, address ROM deficits, address strength deficits, analyze and address soft tissue restrictions, analyze and cue movement patterns, analyze and modify body mechanics/ergonomics, assess and modify postural abnormalities and address imbalance/dizziness to attain remaining goals. []  See Plan of Care  []  See progress note/recertification  []  See Discharge Summary         Progress towards goals / Updated goals:  1.  Patient will increase FOTO score to 80/100 for indications of increased functional mobility. Status at last assessment: 63/100  Current: progressing with pt noting improved function; D/C use of brace for ADLs and today's treatment (2/28/22)   2.  Patient will demo ability to complete forward step down from 6\" step without increased valgus collapse or pain to improve ease with stair negotiation.   Status at last assessment : increased valgus collapse with lateral step downs from 4\" height  Current: goal progressing to 6\" with good form (2/28/22)   3. Patient will jog for 5 minutes at desired pace without increased knee pain to improve ability to return to PLOF recreation activities.   Status at last assessment: has not resumed jogging/plyometrics at this time  Current: progressed plyometrics (2/28/22)   4. Patient will report pain 3/10 at worst for progression to PLOF   Status at last assessment : 5-6/10 pain at worst      NEXT PN DUE 3/23/22    PLAN  [x]  Upgrade activities as tolerated     [x]  Continue plan of care  []  Update interventions per flow sheet       []  Discharge due to:_  [x]  Other: assess response to change in HEP on ease with squatting, consider slider kathe [place], [place] 3/9/2022     Future Appointments   Date Time Provider Joanne Xiao   3/16/2022 11:30 AM Ranjan Brennan [facility]   3/21/2022  9:15 AM Ruth Stevens, PT [facility]   3/28/2022  9:15 AM Ruth Stevens, PT [facility]

## 2022-03-16 ENCOUNTER — APPOINTMENT (OUTPATIENT)
Dept: PHYSICAL THERAPY | Age: 37
End: 2022-03-16
Payer: OTHER GOVERNMENT

## 2022-03-21 ENCOUNTER — HOSPITAL ENCOUNTER (OUTPATIENT)
Dept: PHYSICAL THERAPY | Age: 37
Discharge: HOME | End: 2022-03-21
Payer: OTHER GOVERNMENT

## 2022-03-21 PROCEDURE — 97110 THERAPEUTIC EXERCISES: CPT

## 2022-03-21 PROCEDURE — 97530 THERAPEUTIC ACTIVITIES: CPT

## 2022-03-21 PROCEDURE — 97140 MANUAL THERAPY 1/> REGIONS: CPT

## 2022-03-21 NOTE — PROGRESS NOTES
[facility]   [address] [facility] [address] [place] [address]  Phone: [phone] Fax: [phone]  PROGRESS NOTE  Patient Name: Saray Machado : 1985   Treatment/Medical Diagnosis: Pain in right knee [M25.561]   Referral Source: Rayo Starr MD     Date of Initial Visit: 22 Attended Visits: 10 Missed Visits: 1     SUMMARY OF TREATMENT  Pt is a 39y.o. year old female who presents with co R medial knee pain that started after 5 mile run then stair negotiation on 2021. Treatment has consisted of: therapeutic exercise to improve LE/lumbar strength/mobility; therapeutic activities to improve transfer/lift/carry ability; neuromuscular re-education to improve balance, core stability, neuromuscular control with lifting; physical agent/modality for symptom management; manual therapy for symptom relief and muscle flexibility; patient education to improve symptom management; self Care training; home safety training; stair training, and functional mobility training. CURRENT STATUS  Patient has attended PT for 10 sessions for the treatment of right knee pain. The patient demonstrates moderate progress at this time, demonstrating good progress toward all therapy goals although progress has not been slower than patient desires. She has great strength and ROM of the LE's. Today we located TrP throughout the R adductor group which can be a referred pain pattern for the R medial knee. Also, she notes a previous CYDNEY that could indicate some underlying adductor involvement. (-) testing for meniscus involvement but still possible contusion and ligament-related pain. She demonstrates slight hypermobility of right patellar lateral glide and report increased TTP along medial aspect of femoral condyle indicating potential MPFL involvement. She has no pain with squatting and improved stair negotiation.  Main limtations are pain, returning to full sport function. Will continue to treat/progress according to patellofemoral pain guidelines. Additional assessment includes:    Subjective Gains: decreased overall pain levels, improved hip/knee strength, improvement in ease with stair negotiation; no pain with squatting; Double leg landing/plyometrics. Subjective Deficits: Medial joint line pain, medial adductor pain (distal); running; difficulty lifting children; impact    % improvement:  50%  Pain   Average: 4/10                     Best: 1/10                   Worst: 9/10  Patient Goal: \"get back to all activity\"  FOTO: 57/100      Objective Measures:   Knee AAROM:  Right Knee: WFL, right patellar mobility increased laterally compared to left with some discomfort  Left Knee: [facility] Strength:    Right (/5) Left (/5)   Hip     Flexion 5 5             Abduction 5 5             Extension 5 5   Knee   Extension 5  5              Flexion 5 5   Ankle   Dorsiflexion 5 5      Gait: unremarkable  Functional Squat: squatting knee flexion angle 120 deg, no c/o pain   Stair Negotiation: continued pain with initiating step ups, increased right valgus collapse with step downs  Balance: SLS 30\" B  Special Tests: + valgus stress at 0 for pain; (-) McMurrays; (-) varus stress testing   TTP: medial joint line, distal adductor    Current Goals:  1.  Patient will increase FOTO score to 80/100 for indications of increased functional mobility. Status at last assessment: 63/100  Current: goal regressed at 57/100 (3/21/22)  2.  Patient will demo ability to complete forward step down from 6\" step without increased valgus collapse or pain to improve ease with stair negotiation.   Status at last assessment : increased valgus collapse with lateral step downs from 4\" height  Current: goal progressing; to 6\", 30 reps with good form and min c/o distal adductor pain with first few reps (3/21/22)   3. Patient will jog for 5 minutes at desired pace without increased knee pain to improve ability to return to PLOF recreation activities.   Status at last assessment: has not resumed jogging/plyometrics at this time  Current: progressed plyometrics but pain with SL plyos (3/21/22)   4. Patient will report pain 3/10 at worst for progression to PLOF   Status at last assessment : 5-6/10 pain at worst  Goal not met; ranging 1-9/10, ave 3-4 (3/21/22)     New Goals to be achieved in __4-8__  treatments: All from above    RECOMMENDATIONS   Pt to continue with skilled therapy for 1x/week for 4-8 sessions to improve ease with squatting, lifting children, negotiating stairs, and returning to running for recreation. . please advise if any changes after your MD f/u in 2 weeks. If you have any questions/comments please contact us directly at ([phone]   Thank you for allowing us to assist in the care of your patient. Therapist Signature: Alberto Reid PT Date: 3/21/2022   Reporting Period  2/23/22 to 3/21/22  Time: 8:13 am    NOTE TO PHYSICIAN:  PLEASE COMPLETE THE ORDERS BELOW AND FAX TO   [facility]: [phone]. If you are unable to process this request in 24 hours please contact our office: [phone].  ___ I have read the above report and request that my patient continue as recommended.   ___ I have read the above report and request that my patient continue therapy with the following changes/special instructions:_________________________________________________________   ___ I have read the above report and request that my patient be discharged from therapy.      Physician Signature:        Date:       Time:                                                        Adam Simons MD.

## 2022-03-21 NOTE — PROGRESS NOTES
PT DAILY TREATMENT NOTE 8-14    Patient Name: Tisha Santos  Date:3/21/2022  : 1985  [x]  Patient  Verified  Payor:  / Plan: Servando Roth 74 / Product Type:  /    In time:9:17  Out time:10:13  Total Treatment Time (min): 56  Total Timed Codes (min): 56  1:1 Treatment Time (min): 56   Visit #: 4 of 8    Treatment Area: Pain in right knee [M25.561]    SUBJECTIVE  Pain Level (0-10 scale): 2-3  Any medication changes, allergies to medications, adverse drug reactions, diagnosis change, or new procedure performed?: [x] No    [] Yes (see summary sheet for update)  Subjective functional status/changes:   [] No changes reported  Got a cortisone injection last Thursday. Regretting it now. It's not helping. It did feel good for 2 days and then it's back to life as usual.   MD f/u in 2 weeks and noted he will do a scope (after her vacation in 2-3 weeks). The pain was increasing again. 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 Mostly medial joint line (that is better), now it's the side (points to distal adductor)so that affects the twisting, walking up stairs. Questions for MD: looking at MPFL - is that the cause of my pain.      OBJECTIVE    27 min Therapeutic Exercise:  [x] See flow sheet :   DTm to adductor tejal on the handle of a kettle bell  Adductor slides on slider   [place] planks (addoctor planks)    Rationale: increase ROM and increase strength to improve the patients ability to walk, stand, stairs, gait, running     19 min Manual Therapy:  Pelvic assessment - neutral  DTM, IASTM to R adductor group   Rationale: decrease pain, increase ROM, increase tissue extensibility and decrease trigger points to restore spoert   The manual therapy interventions were performed at a separate and distinct time from the therapeutic activities interventions   (na Add 59 Modifier in conjunction with TA treatment)    10 min Therapeutic Activity:  [x]  See flow sheet :  HEP progression, return to sport; pt education    Rationale: To restore sport         x min Patient Education: [x] Review HEP    [x] Progressed/Changed HEP based on:     Able to keep working out as long as no up-tick in pain that day or day after. Adductor DTM on kettle bell handle  Slider adductor lunges  [place] bridges/ planks on chair (adductor)    [] positioning   [] body mechanics   [] transfers   [] heat/ice application        Other Objective/Functional Measures:   Subjective Gains: decreased overall pain levels, improved hip/knee strength, improvement in ease with stair negotiation; no pain with squatting; Double leg landing/plyometrics.    Subjective Deficits: Medial joint line pain, medial adductor pain (distal); running; difficulty lifting children; impact    % improvement:  50%  Pain   Average: 4/10                     Best: 1/10                   Worst: 9/10  Patient Goal: \"get back to all activity\"     Objective Measures:   Knee AAROM:  Right Knee: WFL, right patellar mobility increased laterally compared to left with some discomfort  Left Knee: [facility] Strength:    Right (/5) Left (/5)   Hip     Flexion 5 5             Abduction 5 5             Extension 5 5   Knee   Extension 5  5              Flexion 5 5   Ankle   Dorsiflexion 5 5      Gait: unremarkable  Functional Squat: squatting knee flexion angle 120 deg, no c/o pain   Stair Negotiation: continued pain with initiating step ups, increased right valgus collapse with step downs  Balance: SLS 30\" B  Special Tests: + valgus stress at 0 for pain; (-) Mc[place]rays; (-) varus stress testing   TTP: medial joint line, distal adductor    Pain Level (0-10 scale) post treatment: 1    ASSESSMENT/Changes in Function: PN    Patient will continue to benefit from skilled PT services to modify and progress therapeutic interventions, address functional mobility deficits, address ROM deficits, address strength deficits, analyze and address soft tissue restrictions, analyze and cue movement patterns, analyze and modify body mechanics/ergonomics, assess and modify postural abnormalities, address imbalance/dizziness and instruct in home and community integration to attain remaining goals. []  See Plan of Care  [x]  See progress note/recertification  []  See Discharge Summary         Progress towards goals / Updated goals:  1.  Patient will increase FOTO score to 80/100 for indications of increased functional mobility. Status at last assessment: 63/100  Current: goal regressed at 57/100 (3/21/22)  2.  Patient will demo ability to complete forward step down from 6\" step without increased valgus collapse or pain to improve ease with stair negotiation.   Status at last assessment : increased valgus collapse with lateral step downs from 4\" height  Current: goal progressing; to 6\", 30 reps with good form and min c/o distal adductor pain with first few reps (3/21/22)   3. Patient will jog for 5 minutes at desired pace without increased knee pain to improve ability to return to PLOF recreation activities.   Status at last assessment: has not resumed jogging/plyometrics at this time  Current: progressed plyometrics but pain with SL plyos (3/21/22)   4. Patient will report pain 3/10 at worst for progression to PLOF   Status at last assessment : 5-6/10 pain at worst  Goal not met; ranging 1-9/10, ave 3-4 (3/21/22)     NEXT PN DUE 4/21/22    PLAN  [x]  Upgrade activities as tolerated     [x]  Continue plan of care  []  Update interventions per flow sheet       []  Discharge due to:_  [x]  Other:_  Recommend co'nt at 1x/week for 4-8 sessions; MD f/u in 1.5 weeks; assess and alter POC as needed      Samaria Jimenez PT 3/21/2022  8:09 AM    Future Appointments   Date Time Provider Joanne Xiao   3/21/2022  9:15 AM Niecy Tilley, PT [facility]   3/28/2022  9:15 AM Niecy Tilley PT [facility]

## 2022-03-28 ENCOUNTER — HOSPITAL ENCOUNTER (OUTPATIENT)
Dept: PHYSICAL THERAPY | Age: 37
Discharge: HOME | End: 2022-03-28
Payer: OTHER GOVERNMENT

## 2022-03-28 PROCEDURE — 97530 THERAPEUTIC ACTIVITIES: CPT

## 2022-03-28 PROCEDURE — 97140 MANUAL THERAPY 1/> REGIONS: CPT

## 2022-03-28 PROCEDURE — 97110 THERAPEUTIC EXERCISES: CPT

## 2022-03-28 NOTE — PROGRESS NOTES
PT DAILY TREATMENT NOTE     Patient Name: Alisha Pryor  Date:3/28/2022  : 1985  [x]  Patient  Verified  Payor:  / Plan: [facility] / Product Type:  /    In time 9:20  Out time: 10:09  Total Treatment Time (min):49  Total Timed Codes (min): 49  1:1 Treatment Time (min): 49   Visit #: 1 of -8    Treatment Area: Pain in right knee [M25.561]    SUBJECTIVE  Pain Level (0-10 scale): 3   Any medication changes, allergies to medications, adverse drug reactions, diagnosis change, or new procedure performed?: [x] No    [] Yes (see summary sheet for update)  Subjective functional status/changes:   [] No changes reported  \"it's ok. better than last week emotionally. The sharp pain started to get better. Yesterday it started minimally but not to a 10/10. The full knee bothers me (points to medial patella, joint line and deep in the knee). Notes there was some swelling (points to lateral joint line). Haven't worked out since [employer] (not on purpose) . See Dr Aldo Hodges on .      OBJECTIVE    30 min Therapeutic Exercise:  [x] See flow sheet :  Added SL Dead lifts  Added single leg eccentric decline squats        Rationale: increase ROM and increase strength to improve the patients ability to walk, stand, stairs, gait, running     9 min Manual Therapy:    DTM, IASTM to R adductor group  R ankle mobilizations and manip with + cavitation  Posterior tibial mobs to increase end range flexion ROM    Rationale: decrease pain, increase ROM, increase tissue extensibility and decrease trigger points to restore spoert   The manual therapy interventions were performed at a separate and distinct time from the therapeutic activities interventions   (na Add 59 Modifier in conjunction with TA treatment)        10 min Therapeutic Activity:  [x]  See flow sheet :  HEP progression, return to sport; pt education   Plyometrics/ ladder drills:  2 in, moving forward; 2 in moving laterally Scissors  2x each down and back   Downhill skiiers 2x each    Rationale: To restore sport           x min Patient Education: [x] Review HEP    [x] Progressed/Changed HEP based on:     Trigger point education  Exercise progression  Adductor training  Box jumps  Up/down 2 to 2  Return to run plyo program: : 2 foot contact program progress this week and then progress to single leg when able  skiier jumps: avoid the jump portion   [] positioning   [] body mechanics   [] transfers   [] heat/ice application        Other Objective/Functional Measures:     End range tightness noted at flexion with c/o joint line tightness/fullness   + TTP at lateral > medial joint line  (-) lag with SLR R   Min c/o medial knee pain that resolves with repetitions of lateral ladder drills going to the L    Pain Level (0-10 scale) post treatment:  0    ASSESSMENT/Changes in Function: less TTP In the R adductor group. Increased ease of movement noted after R ankle mobs/manip and cavitation. Difficulty with SL dead lifts, maintaining R LE stability in the horizontal plane. Patient will continue to benefit from skilled PT services to modify and progress therapeutic interventions, address functional mobility deficits, address ROM deficits, address strength deficits, analyze and address soft tissue restrictions, analyze and cue movement patterns, analyze and modify body mechanics/ergonomics, assess and modify postural abnormalities, address imbalance/dizziness and instruct in home and community integration to attain remaining goals. []  See Plan of Care  [x]  See progress note/recertification  []  See Discharge Summary         Progress towards goals / Updated goals:  1.  Patient will increase FOTO score to 80/100 for indications of increased functional mobility.    Status at last assessment:  goal regressed at 57/100   2.  Patient will demo ability to complete forward step down from 6\" step without increased valgus collapse or pain to improve ease with stair negotiation.   Status at last assessment : goal progressing; to 6\", 30 reps with good form and min c/o distal adductor pain with first few reps  Current: added single leg eccentric decline squats (3/28/22)  3. Patient will jog for 5 minutes at desired pace without increased knee pain to improve ability to return to PLOF recreation activities.   Status at last assessment: progressed plyometrics but pain with SL plyos  Current: added ladder drills (3/28/22)  4. Patient will report pain 3/10 at worst for progression to PLOF   Status at last assessment : ranging 1-9/10, ave 3-4    NEXT PN DUE 4/21/22    PLAN  [x]  Upgrade activities as tolerated     [x]  Continue plan of care  []  Update interventions per flow sheet       []  Discharge due to:_  [x]  Other:_ MD f/u on Thursday; assess and alter POC as needed  Pt out of town until after [facility] (April 17th)      Phyllis Schulz PT 3/28/2022  8:09 AM    Future Appointments   Date Time Provider Joanne Xiao   3/28/2022  9:15 AM Zoe Doyle, PT [facility]

## 2022-04-22 ENCOUNTER — HOSPITAL ENCOUNTER (OUTPATIENT)
Dept: PHYSICAL THERAPY | Age: 37
Discharge: HOME | End: 2022-04-22
Payer: OTHER GOVERNMENT

## 2022-04-22 PROCEDURE — 97110 THERAPEUTIC EXERCISES: CPT

## 2022-04-22 PROCEDURE — 97530 THERAPEUTIC ACTIVITIES: CPT

## 2022-04-22 PROCEDURE — 97535 SELF CARE MNGMENT TRAINING: CPT

## 2022-04-22 NOTE — PROGRESS NOTES
PT DAILY TREATMENT NOTE     Patient Name: Sheila Clemons  Date:2022  : 1985  [x]  Patient  Verified  Payor:  / Plan: Treva Lopez / Product Type:  /    In time 2:15  Out time: 3:15  Total Treatment Time (min):60  Total Timed Codes (min): 50  1:1 Treatment Time (min): 50   Visit #: 2 of 4-8    Treatment Area: Pain in right knee [M25.561]    SUBJECTIVE  Pain Level (0-10 scale): 4  Any medication changes, allergies to medications, adverse drug reactions, diagnosis change, or new procedure performed?: [x] No    [] Yes (see summary sheet for update)  Subjective functional status/changes:   [] No changes reported  Pt reports surgery went well, she has been having some pain in the knee and stiffness, she has been using crutches,     OBJECTIVE  Modalities Rationale:     decrease edema and decrease pain to improve patient's ability to ambulate   min [] Estim, type/location:                                      []  att     []  unatt     []  w/US     []  w/ice    []  w/heat    min []  Mechanical Traction: type/lbs                   []  pro   []  sup   []  int   []  cont    []  before manual    []  after manual    min []  Ultrasound, settings/location:      min []  Iontophoresis w/ dexamethasone, location:                                               []  take home patch       []  in clinic   10 min [x]  Ice     []  Heat    location/position: R knee in reclined long sit    min []  Vasopneumatic Device, press/temp:    If using vaso (only need to measure limb vaso being performed on)      pre-treatment girth :       post-treatment girth :       measured at (landmark location) :      min []  Other:    [] Skin assessment post-treatment (if applicable):    []  intact    []  redness- no adverse reaction                  []redness - adverse reaction:          30 min Therapeutic Exercise:  [x] See flow sheet :       Rationale: increase ROM and increase strength to improve the patients ability to walk, stand, stairs, gait, running        10 min Self Care: Pt education on signs/sx of infection and blood clots and to seek medical care should she experience any adverse or suspicious sx. Rationale: increase awareness and understanding of current condition to improve patients ability to independently and effectively attain goals and progress towards long term management of current condition. 10 min Therapeutic Activity:  [x]  See flow sheet :  Instruction in ambulation with unilateral crutch, and stair navigation with darya crutches. Rationale: To restore sport           x min Patient Education: [x] Review HEP    [x] Progressed/Changed HEP based on:     Trigger point education  Exercise progression  Adductor training  Box jumps  Up/down 2 to 2  Return to run plyo program: : 2 foot contact program progress this week and then progress to single leg when able  skiier jumps: avoid the jump portion   [] positioning   [] body mechanics   [] transfers   [] heat/ice application        Other Objective/Functional Measures:   See PN  Pain Level (0-10 scale) post treatment:  4/10    ASSESSMENT/Changes in Function:  Pt required vc and demo with all new exercises to perform correctly, HEP was updated to reflect appropriate exercises for post surgical status. Patient will continue to benefit from skilled PT services to modify and progress therapeutic interventions, address functional mobility deficits, address ROM deficits, address strength deficits, analyze and address soft tissue restrictions, analyze and cue movement patterns, analyze and modify body mechanics/ergonomics, assess and modify postural abnormalities, address imbalance/dizziness and instruct in home and community integration to attain remaining goals.      []  See Plan of Care  [x]  See progress note/recertification  []  See Discharge Summary         Progress towards goals / Updated goals:  See PN  PLAN  [x]  Upgrade activities as tolerated     [x]  Continue plan of care  []  Update interventions per flow sheet       []  Discharge due to:_  []  Other:_     Claudio Flowers 4/22/2022  8:09 AM    Future Appointments   Date Time Provider Joanne Xiao   4/22/2022  2:15 PM Marino [facility]

## 2022-04-22 NOTE — PROGRESS NOTES
[facility]   [id number] [place] [address] [address] [place] [address]  Phone: [phone] Fax: [phone]  PROGRESS NOTE  Patient Name: Jason López : 1985   Treatment/Medical Diagnosis: Pain in right knee [M25.561]   Referral Source: Moo Velasquez MD     Date of Initial Visit: 22 Attended Visits: 12 Missed Visits: 1     SUMMARY OF TREATMENT  Pt is a 39y.o. year old female who presents with co R medial knee pain S/P knee arthroscopy DOS 22Treatment has consisted of: therapeutic exercise to improve LE/lumbar strength/mobility; therapeutic activities to improve transfer/lift/carry ability; neuromuscular re-education to improve balance, core stability, neuromuscular control with lifting; physical agent/modality for symptom management; manual therapy for symptom relief and muscle flexibility; patient education to improve symptom management; self Care training; home safety training; stair training, and functional mobility training. CURRENT STATUS  Pt underwent arthroscopy on 22, overall she is doing well since surgery. She does present with a functional decline secondary to post surgical uncomplicated tissue healing, swelling, and reliance on AD (NOEMI crutches). At this point pt demonstrates dec ROM, dec strength, and dec balance s/p knee arthroscopy. These deficits are impairing her ability to walk, stand, squat, navigate stairs, and/or participate in typical recreational activities. Skilled care is warranted to continue in order to progress pt back to PLOF. Subjective Gains: general functional regression since surgery   Subjective Deficits: relies on NOEMI crutches to ambulate, unable to fully WB in RLE, unable to navigate stairs independently or reciprocally, unable to squat, difficulty with transfers/bed mobility, unable to participate in typical recreational activities.     % improvement:0% since recent surgery  Pain   Average: 4-5/10                     Best: 0-2/10                   Worst: 8/10  Patient Goal: \"get back to my normal activity\"  FOTO: Unable to assess today due to website being down, will test as able.       Objective Measures:   Knee AAROM:  Right Knee: 18-78 degp! Left Knee: [facility]     LE Strength:    Right (/5) Left (/5)   Hip     Flexion 4 5             Abduction 4 5             Extension 3 5   Knee   Extension 3- with quad lag  5              Flexion 3- 5   Ankle   Dorsiflexion NT 5      Gait: Pt ambulates with NOEMI crutches and TTWB on RLE, RLE demonstrates inc knee flexion throughout gait cycle, dec WB in stance phase,   Functional Squat: unable secondary to WB tolerance  Stair Negotiation: nonreciprocal RLE compensatory pattern with NOEMI crutches  Balance: NT due to inability to fully WB through RLE  Quad set: R poor+  Edema: measured circumferentially at joint line 34cm, at base of patella 39.2 cm  Special Tests: not indicated  TTP: medial joint line, distal quad, proximal calf, distal adductor avoiding incisions, noted bruising along medial epicondyle and patella. Incisions appear clean and dry. Current Goals:  1.  Patient will increase FOTO score to 80/100 for indications of increased functional mobility. Status at last assessment: 57/100  Current:NT due to website malfunction  2.  Patient will demo ability to complete forward step down from 6\" step without increased valgus collapse or pain to improve ease with stair negotiation.   Status at last assessment : to 6\", 30 reps with good form and min c/o distal adductor pain with first few reps 3/21/22  Current:  nonreciprocal RLE compensatory pattern with NOEMI crutches  3. Patient will jog for 5 minutes at desired pace without increased knee pain to improve ability to return to PLOF recreation activities.   Status at last assessment: progressed plyometrics but pain with SL plyos (3/21/22)   Current: Unable secondary to surgery  4. Patient will report pain 3/10 at worst for progression to PLOF   Status at last assessment : ranging 1-9/10, ave 3-4 (3/21/22)   Current: 0-8/10     New Goals to be achieved in __4-8_  treatments:  1.  Patient will increase FOTO score to 80/100 for indications of increased functional mobility. Status at last assessment: NT due to website malfunction  2.  Patient will demo ability to complete forward step down from 6\" step without increased valgus collapse or pain to improve ease with stair negotiation. Status at last assessment :nonreciprocal RLE compensatory pattern with NOEMI crutches  3. Patient will ambulate independently for 30 mins for ADLs  Status at last assessment: Pt ambulates with NOEMI crutches and TTWB on RLE, RLE demonstrates inc knee flexion throughout gait cycle, dec WB in stance phase  4. Patient will report pain 3/10 at worst for progression to PLOF   Status at last assessment :  0-8/10     RECOMMENDATIONS   Pt to continue with skilled therapy for 1-2 x/week for 4-8 sessions to improve functional strength, ROM, and balance to return pt to PLOF     If you have any questions/comments please contact us directly at [phone]   Thank you for allowing us to assist in the care of your patient. Therapist Signature: Jose L Griffith Date: 4/22/2022   Reporting Period  3/21/22 -4/22/22 Time: 8:13 am    NOTE TO PHYSICIAN:  PLEASE COMPLETE THE ORDERS BELOW AND FAX TO   [facility]: [phone]. If you are unable to process this request in 24 hours please contact our office: [phone].  ___ I have read the above report and request that my patient continue as recommended.   ___ I have read the above report and request that my patient continue therapy with the following changes/special instructions:_________________________________________________________   ___ I have read the above report and request that my patient be discharged from therapy.      Physician Signature:        Date: Time:                                                        Dionne Roche MD.

## 2022-04-25 ENCOUNTER — HOSPITAL ENCOUNTER (OUTPATIENT)
Dept: PHYSICAL THERAPY | Age: 37
Discharge: HOME | End: 2022-04-25
Payer: OTHER GOVERNMENT

## 2022-04-25 PROCEDURE — 97110 THERAPEUTIC EXERCISES: CPT

## 2022-04-25 PROCEDURE — 97112 NEUROMUSCULAR REEDUCATION: CPT

## 2022-04-25 PROCEDURE — 97140 MANUAL THERAPY 1/> REGIONS: CPT

## 2022-04-25 NOTE — PROGRESS NOTES
PT DAILY TREATMENT NOTE     Patient Name: Kaykay Crawford  Date:2022  : 1985  [x]  Patient  Verified  Payor: VERNELL / Plan: Servando Roth 74 / Product Type:  /    In time: 2:45 pm           Out time: 3:40 pm  Total Treatment Time (min): 55  Visit #: 1 of -8    Treatment Area: Pain in right knee [M25.561]    SUBJECTIVE  Pain Level (0-10 scale): 3  Any medication changes, allergies to medications, adverse drug reactions, diagnosis change, or new procedure performed?: [x] No    [] Yes (see summary sheet for update)  Subjective functional status/changes:   [] No changes reported  \"The pain is better. My knee sometimes feels unstable when I am walking, but mainly in the morning. I have been trying to walk without the crutches. I haven't had a change to do the exercises yet, just been so busy. \"    OBJECTIVE  Modalities Rationale:     decrease edema and decrease pain to improve patient's ability to ambulate   min [] Estim, type/location:                                      []  att     []  unatt     []  w/US     []  w/ice    []  w/heat    min []  Mechanical Traction: type/lbs                   []  pro   []  sup   []  int   []  cont    []  before manual    []  after manual    min []  Ultrasound, settings/location:      min []  Iontophoresis w/ dexamethasone, location:                                               []  take home patch       []  in clinic   10 min [x]  Ice     []  Heat    location/position: (R) knee in supine with LEs elevated on wedge    min []  Vasopneumatic Device, press/temp:    If using vaso (only need to measure limb vaso being performed on)      pre-treatment girth :       post-treatment girth :       measured at (landmark location) :      min []  Other:    [x] Skin assessment post-treatment (if applicable):    [x]  intact    []  redness- no adverse reaction                  []redness - adverse reaction:          23 min Therapeutic Exercise:  [x] See flow sheet: interventions as dictated by HEP     Rationale: increase ROM and increase strength to improve the patients ability to walk, stand, stairs, gait, running. 14 min Neuromuscular Re-education:  [x]  See flow sheet: initiated split stance weight shifting (forwand and backwards) emphasis on heel off and toe off to mimic proper dynamics of gait   Rationale: improve coordination, improve balance and increase proprioception  to improve the patients ability to normalize gait       8 min Manual Therapy:  retrograde massage to the (R) LE   Rationale: decrease edema  to improve the patients ability to perform ADLs. The manual therapy interventions were performed at a separate and distinct time from the therapeutic activities interventions. X min Patient Education: [x] Review HEP; discussed importance to heel-toe patterning for stance phase of gait and encouraged utilization of axillary crutches while attempting to normalize gait pattern as to avoid any compensatory and habitual patterns     Other Objective/Functional Measures:   Weight bearing status: WBAT - patient able to tolerate full weight-bearing using weight scale; min discomfort with weight shift with the (R) LE posterior, therefore, encouraged UE assist to improve comfort    Pain Level (0-10 scale) post treatment: 0    ASSESSMENT/Changes in Function:   Patient with significant increases in right knee AAROM (approx 3-5 deg from full TKE to 120 deg knee flexion AAROM). Discussed reversion to use of axillary crutches to avoid habitual compensation with patient in agreement; planning to wean away following another week of use based on patient's progress.     Patient will continue to benefit from skilled PT services to modify and progress therapeutic interventions, address functional mobility deficits, address ROM deficits, address strength deficits, analyze and address soft tissue restrictions, analyze and cue movement patterns, analyze and modify body mechanics/ergonomics, assess and modify postural abnormalities, address imbalance/dizziness and instruct in home and community integration to attain remaining goals. []  See Plan of Care  [x]  See progress note/recertification  []  See Discharge Summary         Progress towards goals / Updated goals:  1.  Patient will increase FOTO score to 80/100 for indications of increased functional mobility. Status at last assessment: NT due to website malfunction  2.  Patient will demo ability to complete forward step down from 6\" step without increased valgus collapse or pain to improve ease with stair negotiation.   Status at last assessment: nonreciprocal RLE compensatory pattern with NOEMI crutches  3. Patient will ambulate independently for 30 mins for ADLs  Status at last assessment: patient ambulates with NOEMI crutches and TTWB on RLE, RLE demonstrates inc knee flexion throughout gait cycle, dec WB in stance phase  4. Patient will report pain 3/10 at worst for progression to PLOF   Status at last assessment: 0-8/10     PLAN  [x]  Upgrade activities as tolerated     [x]  Continue plan of care  []  Update interventions per flow sheet       []  Discharge due to:_  []  Other:_     Omid Bragg PTA 4/25/2022    Future Appointments   Date Time Provider Joanne Ninoska   4/25/2022  2:45 PM Brinda Mcdonald PTA [facility]   4/27/2022  9:15 AM [facility] PT [place] 2 [facility]   5/4/2022  9:15 AM [facility] PT [place] 2 [facility]

## 2022-04-27 ENCOUNTER — HOSPITAL ENCOUNTER (OUTPATIENT)
Dept: PHYSICAL THERAPY | Age: 37
Discharge: HOME | End: 2022-04-27
Payer: OTHER GOVERNMENT

## 2022-04-27 PROCEDURE — 97140 MANUAL THERAPY 1/> REGIONS: CPT

## 2022-04-27 PROCEDURE — 97110 THERAPEUTIC EXERCISES: CPT

## 2022-04-27 PROCEDURE — 97116 GAIT TRAINING THERAPY: CPT

## 2022-04-27 PROCEDURE — 97112 NEUROMUSCULAR REEDUCATION: CPT

## 2022-04-27 PROCEDURE — 97016 VASOPNEUMATIC DEVICE THERAPY: CPT

## 2022-04-27 NOTE — PROGRESS NOTES
PT DAILY TREATMENT NOTE     Patient Name: Kamala Vazquez  Date:2022  : 1985  [x]  Patient  Verified  Payor: VERNELL / Plan: Servando Roth 74 / Product Type:  /    In time: 995    Out time:1025  Total Treatment Time (min): 79  Visit #: 2 of -8    Treatment Area: Pain in right knee [M25.561]    SUBJECTIVE  Pain Level (0-10 scale): 4  Any medication changes, allergies to medications, adverse drug reactions, diagnosis change, or new procedure performed?: [x] No    [] Yes (see summary sheet for update)  Subjective functional status/changes:   [] No changes reported  Patient reports some soreness but overall progressing     OBJECTIVE  Modalities Rationale:     decrease edema and decrease pain to improve patient's ability to ambulate and improve quad recruitment    min [] Estim, type/location:                                      []  att     []  unatt     []  w/US     []  w/ice    []  w/heat    min []  Mechanical Traction: type/lbs                   []  pro   []  sup   []  int   []  cont    []  before manual    []  after manual    min []  Ultrasound, settings/location:      min []  Iontophoresis w/ dexamethasone, location:                                               []  take home patch       []  in clinic    min [x]  Ice     []  Heat    location/position: (R) knee in supine with LEs elevated on wedge   10 min [x]  Vasopneumatic Device, press/temp: Min temp, mod compression    If using vaso (only need to measure limb vaso being performed on)      pre-treatment girth : 37      post-treatment girth : 35.75      measured at (landmark location) : mid patella       min []  Other:    [x] Skin assessment post-treatment (if applicable):    [x]  intact    []  redness- no adverse reaction                  []redness - adverse reaction:          42 min Therapeutic Exercise:  [x] See flow sheet: assess while on the bike      Rationale: increase ROM and increase strength to improve the patients ability to walk, stand, stairs, gait, running. 8 min Gait training   Pre gait step through with VCing   Pre gait SLS for wt acceptance   VCing for gait quality throughout treatment    Rationale: improve coordination, improve balance and increase proprioception  to improve the patients ability to normalize gait       10 min Manual Therapy:  Patellar mobs, grade 1-2 joint mobs for extension, PROM flexion   Rationale: decrease edema  to improve the patients ability to perform ADLs. The manual therapy interventions were performed at a separate and distinct time from the therapeutic activities interventions. X min Patient Education: [x] Review HEP , assisting leg in SLR for transfers   Add SLS for 5 sec to HEP - patient declined pictures   Cont step to stair negotiation till quad strengthens      Other Objective/Functional Measures:   AROM 2- 123 , PROM 0-125   Edema 37 R, 35 L       Pain Level (0-10 scale) post treatment: 0    ASSESSMENT/Changes in Function:   Patient reports first FU post surgery caused mild soreness but not adverse reaction. Cont to amb with B axillary crutches for community distances but no AD around the home and short distances in the clinic. Patient reports quad tendon pain with attempts at [facility] for tranfers -  evaluated quad for adequate strength for SLR and patient does have + quad lag . Patient education on assisting LE for transfers as needed till quad is sufficiently strong. Noted B knee extension lacking 2-3 deg likely sec to distal HS tightness Bilaterally as patient reports hx of HS tightness.  Initiated VASO sec to noted quad inhibition from swelling     Patient will continue to benefit from skilled PT services to modify and progress therapeutic interventions, address functional mobility deficits, address ROM deficits, address strength deficits, analyze and address soft tissue restrictions, analyze and cue movement patterns, analyze and modify body mechanics/ergonomics, assess and modify postural abnormalities, address imbalance/dizziness and instruct in home and community integration to attain remaining goals. []  See Plan of Care  [x]  See progress note/recertification 0/[id number]/[id number]  []  See Discharge Summary         Progress towards goals / Updated goals:  PN due 5/22  1.  Patient will increase FOTO score to 80/100 for indications of increased functional mobility. Status at last assessment: NT due to website malfunction  2.  Patient will demo ability to complete forward step down from 6\" step without increased valgus collapse or pain to improve ease with stair negotiation.   Status at last assessment: nonreciprocal RLE compensatory pattern with NOEMI crutches  3. Patient will ambulate independently for 30 mins for ADLs  Status at last assessment: patient ambulates with NOEMI crutches and TTWB on RLE, RLE demonstrates inc knee flexion throughout gait cycle, dec WB in stance phase  4. Patient will report pain 3/10 at worst for progression to PLOF   Status at last assessment: 0-8/10     PLAN  [x]  Upgrade activities as tolerated     [x]  Continue plan of care  []  Update interventions per flow sheet       []  Discharge due to:_  [x]  Other:_enouraged patient to schedule more apts 2x 6 weeks for recovery post surgery      Pedro Snowden, PT 4/27/2022    Future Appointments   Date Time Provider Joanne Xiao   4/27/2022  9:15 AM Miller Resendez, PT [facility]   5/2/2022 10:45 AM Noel Barr PTA [facility]   5/4/2022  9:15 AM [facility] PT [place] 2 [facility]

## 2022-05-02 ENCOUNTER — HOSPITAL ENCOUNTER (OUTPATIENT)
Dept: PHYSICAL THERAPY | Age: 37
Discharge: HOME | End: 2022-05-02
Payer: OTHER GOVERNMENT

## 2022-05-02 PROCEDURE — 97110 THERAPEUTIC EXERCISES: CPT

## 2022-05-02 PROCEDURE — 97140 MANUAL THERAPY 1/> REGIONS: CPT

## 2022-05-02 NOTE — PROGRESS NOTES
PT DAILY TREATMENT NOTE -    Patient Name: Kvein Khanna  Date:2022  : 1985  [x]  Patient  Verified  Payor: VERNELL / Plan: Servando Roth 74 / Product Type:  /    In time: 10:46 am          Out time: 11:51 am  Total Treatment Time (min): 65  Visit #: 2 of 4-8    Treatment Area: Pain in right knee [M25.561]    SUBJECTIVE  Pain Level (0-10 scale): 4  Any medication changes, allergies to medications, adverse drug reactions, diagnosis change, or new procedure performed?: [x] No    [] Yes (see summary sheet for update)  Subjective functional status/changes:   [] No changes reported  \"I feel like I hit a little bit of a plateau. \"    OBJECTIVE  Modalities Rationale:     decrease edema and decrease pain to improve patient's ability to ambulate and improve quad recruitment    min [] Estim, type/location:                                      []  att     []  unatt     []  w/US     []  w/ice    []  w/heat    min []  Mechanical Traction: type/lbs                   []  pro   []  sup   []  int   []  cont    []  before manual    []  after manual    min []  Ultrasound, settings/location:      min []  Iontophoresis w/ dexamethasone, location:                                               []  take home patch       []  in clinic    min [x]  Ice     []  Heat    location/position: (R) knee in supine with LEs elevated on wedge   10 min [x]  Vasopneumatic Device, press/temp: Min temp, min compression   LEs elevated by table   If using vaso (only need to measure limb vaso being performed on)      pre-treatment girth: 37 cm      post-treatment girth: 35.4 cm      measured at (landmark location) : mid patella       min []  Other:    [x] Skin assessment post-treatment (if applicable):    [x]  intact    []  redness- no adverse reaction                  []redness - adverse reaction:          43 min Therapeutic Exercise:  [x] See flow sheet: added standing HR/TR, marchkatlyn, sit<>stand on elevated table Rationale: increase ROM and increase strength to improve the patients ability to walk, stand, stairs, gait, running. 0 min Gait training: NT today   Rationale: improve coordination, improve balance and increase proprioception  to improve the patients ability to normalize gait       12 min Manual Therapy:  retrograde massage to the (R) LE; patellar mobs, grade 1-2 joint mobs for extension, PROM flexion   Rationale: decrease edema  to improve the patients ability to perform ADLs. The manual therapy interventions were performed at a separate and distinct time from the therapeutic activities interventions. X min Patient Education: [x] Review HEP     Other Objective/Functional Measures:       Pain Level (0-10 scale) post treatment: 3    ASSESSMENT/Changes in Function:   Patient with quadriceps inhibition likely due to edema / tissue recovery following surgical intervention. Noted near full TKE in supine, especially after passive stretching. Progressing appropriately considering only nearing two weeks post-operatively. Patient will continue to benefit from skilled PT services to modify and progress therapeutic interventions, address functional mobility deficits, address ROM deficits, address strength deficits, analyze and address soft tissue restrictions, analyze and cue movement patterns, analyze and modify body mechanics/ergonomics, assess and modify postural abnormalities, address imbalance/dizziness and instruct in home and community integration to attain remaining goals. []  See Plan of Care  [x]  See progress note/recertification 3/56/9299  []  See Discharge Summary         Progress towards goals / Updated goals:  PN due 5/22  1.  Patient will increase FOTO score to 80/100 for indications of increased functional mobility.    Status at last assessment: NT due to website malfunction  2.  Patient will demo ability to complete forward step down from 6\" step without increased valgus collapse or pain to improve ease with stair negotiation.   Status at last assessment: nonreciprocal RLE compensatory pattern with NOEMI crutches  3. Patient will ambulate independently for 30 mins for ADLs  Status at last assessment: patient ambulates with NOEMI crutches and TTWB on RLE, RLE demonstrates inc knee flexion throughout gait cycle, dec WB in stance phase   Current: goal progressing; pt arriving without AD today, with decreased loading response on the (R) LE and quick lock to extension (5/2/22)  4. Patient will report pain 3/10 at worst for progression to PLOF   Status at last assessment: 0-8/10     PLAN  [x]  Upgrade activities as tolerated     [x]  Continue plan of care  []  Update interventions per flow sheet       []  Discharge due to:_  []  Other:_    Wei Gillespie, PTA 5/2/2022    Future Appointments   Date Time Provider Joanne Xiao   5/4/2022  9:15 AM Doyce Flight [facility]   5/10/2022 10:45 AM Doyce Flight [facility]   5/13/2022 11:45 AM Vania Sams, PT [facility]   5/16/2022 10:45 AM Doyce Flight [facility]   5/18/2022 10:45 AM Doyce Flight [facility]

## 2022-05-04 ENCOUNTER — HOSPITAL ENCOUNTER (OUTPATIENT)
Dept: PHYSICAL THERAPY | Age: 37
Discharge: HOME | End: 2022-05-04
Payer: OTHER GOVERNMENT

## 2022-05-04 PROCEDURE — 97016 VASOPNEUMATIC DEVICE THERAPY: CPT

## 2022-05-04 PROCEDURE — 97140 MANUAL THERAPY 1/> REGIONS: CPT

## 2022-05-04 PROCEDURE — 97110 THERAPEUTIC EXERCISES: CPT

## 2022-05-04 NOTE — PROGRESS NOTES
PT DAILY TREATMENT NOTE     Patient Name: Kaykay Crawford  Date:2022  : 1985  [x]  Patient  Verified  Payor:  / Plan: Servando Roth 74 / Product Type:  /    In time:9:16  Out time:10:28  Total Treatment Time (min): 72  Visit #: 4 of 4-8    Medicare/BCBS Only   Total Timed Codes (min):   1:1 Treatment Time:         Treatment Area: Pain in right knee [M25.561]    SUBJECTIVE  Pain Level (0-10 scale): 2  Any medication changes, allergies to medications, adverse drug reactions, diagnosis change, or new procedure performed?: [x] No    [] Yes (see summary sheet for update)  Subjective functional status/changes:   [] No changes reported  \"Yesterday was a bad day, I am not sure why. \"    OBJECTIVE    Modality rationale: decrease inflammation and decrease pain to improve the patients ability to relax and sleep following therapy session to improve restorative sleep patterns.     Min Type Additional Details    [x] Estim:  []Unatt       []IFC  []Premod                        []Other:  []w/ice   []w/heat  Position:   Location:     [] Estim: []Att    []TENS instruct  []NMES                    []Other:  []w/US   []w/ice   []w/heat  Position:  Location:    []  Traction: [] Cervical       []Lumbar                       [] Prone          []Supine                       []Intermittent   []Continuous Lbs:  [] before manual  [] after manual    []  Ultrasound: []Continuous   [] Pulsed                           []1MHz   []3MHz W/cm2:  Location:    []  Iontophoresis with dexamethasone         Location: [] Take home patch   [] In clinic    []  Ice     []  heat  []  Ice massage  []  Laser   []  Anodyne Position:   Location:     []  Laser with stim  []  Other:  Position:  Location:   10 + 2 [x]  Vasopneumatic Device    [x]  Right     []  Left  Pre-treatment girth: 36.8 cm  Post-treatment girth: 35.2 cm  Measured at (location): mid patella Pressure:       [] lo [x] med [] hi   Temperature: [x] lo [] med [] hi   [x] Skin assessment post-treatment:  [x]intact []redness- no adverse reaction    []redness - adverse reaction:       42 min Therapeutic Exercise:  [x] See flow sheet :   Rationale: increase ROM and increase strength to improve the patients ability to perform gait and transfers with improved LE strength and mobility. 18 min Manual Therapy:  retrograde massage to the right knee, starting proximally at distal thigh and progressing to proximal calf; patellar mobs grade II; PROM flexion with slight overpressure   The manual therapy interventions were performed at a separate and distinct time from the therapeutic activities interventions. Rationale: decrease pain, increase ROM, increase tissue extensibility and decrease trigger points to improve ease of ADLs after therapy session. With   [] TE   [] TA   [] neuro   [] other: Patient Education: [x] Review HEP    [] Progressed/Changed HEP based on:   [] positioning   [] body mechanics   [] transfers   [] heat/ice application    [] other:      Other Objective/Functional Measures:   -Right knee flexion 127 deg with therapist assistance     Pain Level (0-10 scale) post treatment: 0    ASSESSMENT/Changes in Function: Added straight leg raises to improve rectus femoris activation and regressed squats to bridges to promote LE co-contraction without increased knee pain. Patient continues to report extensive knee effusion with weight bearing activities at this time, Advised pt that use of unilateral axillary crutch may be useful for longer distances of ambulation to decreased extensive weight bearing and limit reactive effusion; this is especially true as pt cont to demo extensor lag with SLR and reports knee buckling at times with gait.     Patient will continue to benefit from skilled PT services to modify and progress therapeutic interventions, address functional mobility deficits, address ROM deficits, address strength deficits, analyze and address soft tissue restrictions, analyze and cue movement patterns, analyze and modify body mechanics/ergonomics, assess and modify postural abnormalities and address imbalance/dizziness to attain remaining goals. []  See Plan of Care  []  See progress note/recertification  []  See Discharge Summary         Progress towards goals / Updated goals:  1.  Patient will increase FOTO score to 80/100 for indications of increased functional mobility. Status at last assessment: NT due to website malfunction  Current:   2.  Patient will demo ability to complete forward step down from 6\" step without increased valgus collapse or pain to improve ease with stair negotiation.   Status at last assessment: nonreciprocal RLE compensatory pattern with NOEMI crutches  Current: reports increased pain after squatting so held step ups at this time (5/4/22)   3. Patient will ambulate independently for 30 mins for ADLs  Status at last assessment: patient ambulates with NOEMI crutches and TTWB on RLE, RLE demonstrates inc knee flexion throughout gait cycle, dec WB in stance phase   Current: goal progressing; pt arriving without AD today, with decreased loading response on the (R) LE and quick lock to extension (5/2/22)  4. Patient will report pain 3/10 at worst for progression to PLOF   Status at last assessment: 0-8/10  Current:     PLAN  [x]  Upgrade activities as tolerated     [x]  Continue plan of care  []  Update interventions per flow sheet       []  Discharge due to:_  []  Other:_      Raul Adkins 5/4/2022  9:24 AM    Future Appointments   Date Time Provider Joanne Xiao   5/10/2022 10:45 AM Noah Sims [facility]   5/13/2022 11:45 AM Filomena Butterfield PT [facility]   5/16/2022 10:45 AM Noah Sims [facility]   5/18/2022 10:45 AM Noah Sims [facility]

## 2022-05-10 ENCOUNTER — HOSPITAL ENCOUNTER (OUTPATIENT)
Dept: PHYSICAL THERAPY | Age: 37
Discharge: HOME | End: 2022-05-10
Payer: OTHER GOVERNMENT

## 2022-05-10 PROCEDURE — 97110 THERAPEUTIC EXERCISES: CPT

## 2022-05-10 PROCEDURE — 97016 VASOPNEUMATIC DEVICE THERAPY: CPT

## 2022-05-10 NOTE — PROGRESS NOTES
PT DAILY TREATMENT NOTE     Patient Name: Scooter Carlson  Date:5/10/2022  : 1985  [x]  Patient  Verified  Payor: VERNELL / Plan: Serv[facility] [address] / Product Type:  /    In time:10:46  Out time:11:45  Total Treatment Time (min): 61  Visit #: 5 of 4-8    Medicare/BCBS Only   Total Timed Codes (min):   1:1 Treatment Time:         Treatment Area: Pain in right knee [M25.561]    SUBJECTIVE  Pain Level (0-10 scale): 3  Any medication changes, allergies to medications, adverse drug reactions, diagnosis change, or new procedure performed?: [x] No    [] Yes (see summary sheet for update)  Subjective functional status/changes:   [] No changes reported  \"My knee has been doing better over the past weekend. Less swelling and less buckling, the compression sleeve has been helpful. \"    OBJECTIVE    Modality rationale: decrease inflammation and decrease pain to improve the patients ability to relax and sleep following therapy session to improve restorative sleep patterns.     Min Type Additional Details    [x] Estim:  []Unatt       []IFC  []Premod                        []Other:  []w/ice   []w/heat  Position:   Location:     [] Estim: []Att    []TENS instruct  []NMES                    []Other:  []w/US   []w/ice   []w/heat  Position:  Location:    []  Traction: [] Cervical       []Lumbar                       [] Prone          []Supine                       []Intermittent   []Continuous Lbs:  [] before manual  [] after manual    []  Ultrasound: []Continuous   [] Pulsed                           []1MHz   []3MHz W/cm2:  Location:    []  Iontophoresis with dexamethasone         Location: [] Take home patch   [] In clinic    []  Ice     []  heat  []  Ice massage  []  Laser   []  Anodyne Position:   Location:     []  Laser with stim  []  Other:  Position:  Location:   10 + 2 [x]  Vasopneumatic Device    [x]  Right     []  Left  Pre-treatment girth: 34.5 cm  Post-treatment girth: 35.6 cm  Measured at (location): right knee Pressure:       [] lo [x] med [] hi   Temperature: [x] lo [] med [] hi   [x] Skin assessment post-treatment:  [x]intact []redness- no adverse reaction    []redness - adverse reaction:       47 min Therapeutic Exercise:  [x] See flow sheet :   Rationale: increase ROM and increase strength to improve the patients ability to perform gait and transfers with improved LE strength and mobility. With   [] TE   [] TA   [] neuro   [] other: Patient Education: [x] Review HEP    [] Progressed/Changed HEP based on:   [] positioning   [] body mechanics   [] transfers   [] heat/ice application    [] other:      Other Objective/Functional Measures: -Pt reports increased right knee pain during closed kinetic chain interventions at both 70-80 deg of knee flexion and 30 deg loaded knee flexion with squatting     Pain Level (0-10 scale) post treatment: 3-4    ASSESSMENT/Changes in Function: Pt verbally cues to perform quadriceps sets and SAQ with both LEs simultaneously to encourage overflow from left to right LE with muscle activation. Pt demonstrates improved quadriceps contraction with this technique. Pt reports increased anteromedial knee pain with squatting at 30 deg knee flexion and in increased depth of isometric squat holds. Increased mid patella girth measurement at end of session could be measurement error on part of therapist or demonstrating reactive effusion to today's interventions; reassess at next session. Patient will continue to benefit from skilled PT services to modify and progress therapeutic interventions, address functional mobility deficits, address ROM deficits, address strength deficits, analyze and address soft tissue restrictions, analyze and cue movement patterns, analyze and modify body mechanics/ergonomics, assess and modify postural abnormalities and address imbalance/dizziness to attain remaining goals.      []  See Plan of Care  []  See progress note/recertification  []  See Discharge Summary         Progress towards goals / Updated goals:  1.  Patient will increase FOTO score to 80/100 for indications of increased functional mobility. Status at last assessment: NT due to website malfunction  Current:   2.  Patient will demo ability to complete forward step down from 6\" step without increased valgus collapse or pain to improve ease with stair negotiation.   Status at last assessment: nonreciprocal RLE compensatory pattern with NOEMI crutches  Current: reports increased pain after squatting so held step ups at this time (5/4/22)   3. Patient will ambulate independently for 30 mins for ADLs  Status at last assessment: patient ambulates with NOEMI crutches and TTWB on RLE, RLE demonstrates inc knee flexion throughout gait cycle, dec WB in stance phase   Current: goal progressing; pt arriving without AD today, with decreased loading response on the (R) LE and quick lock to extension (5/2/22)  4. Patient will report pain 3/10 at worst for progression to PLOF   Status at last assessment: 0-8/10  Current:     PLAN  [x]  Upgrade activities as tolerated     [x]  Continue plan of care  []  Update interventions per flow sheet       []  Discharge due to:_  []  Other:_      Gladys Fung 5/10/2022  10:38 AM    Future Appointments   Date Time Provider Joanne Xiao   5/10/2022 10:45 AM Antonina Spatz [facility]   5/13/2022 11:45 AM Migdalia Parkinson PT [facility]   5/16/2022 10:45 AM Antonina Spatz [facility]   5/18/2022 10:45 AM Antonina Spatz [facility]

## 2022-05-12 NOTE — PROGRESS NOTES
PT DAILY TREATMENT NOTE     Patient Name: Chloé George  Date:2022  : 1985  [x]  Patient  Verified  Payor:  / Plan: [facility]  [facility] / Product Type:  /    In time: 11:50  Out time: 12:37  Total Treatment Time (min): 47  Visit #: 6 of 4-8    Medicare/BCBS Only   Total Timed Codes (min):   1:1 Treatment Time:         Treatment Area: Pain in right knee [M25.561]    SUBJECTIVE  Pain Level (0-10 scale): 4  Any medication changes, allergies to medications, adverse drug reactions, diagnosis change, or new procedure performed?: [x] No    [] Yes (see summary sheet for update)  Subjective functional status/changes:   [] No changes reported  Pt was back on crutch as of Tuesday night. Notes poor tolerance to the stand to squat and the bridges;   I was (B) crutches on Wednesday and now on the couch for a few days. OBJECTIVE    Modality rationale: decrease inflammation and decrease pain to improve the patients ability to relax and sleep following therapy session to improve restorative sleep patterns. Min Type Additional Details   10 + 2 setup  [x]estim Wallisian 5 on, 10\" off with quad set   Co-contraction to the R Quad :  Position:  Location:   TC [x]  Vasopneumatic Device    [x]  Right     []  Left  Pre-treatment girth: 35.5  Post-treatment girth: NI  Measured at (location): right mid patella  Pressure:       [] lo [x] med [] hi   Temperature: [x] lo [] med [] hi   [x] Skin assessment post-treatment:  [x]intact []redness- no adverse reaction    []redness - adverse reaction:       19 min Therapeutic Exercise:  [x] See flow sheet :   Rationale: increase ROM and increase strength to improve the patients ability to perform gait and transfers with improved LE strength and mobility.     16 min Manual therapy : patellar glides all planes for pain relief and mobility; IASTM to distal VL; DTm and retrograde to distal quad; K tape with medial pull to the patella to avoid c/o pain with quad sets  Rationale: to decrease pain, edema and improve function           With   [] TE   [] TA   [] neuro   [] other: Patient Education: [x] Review HEP    [] Progressed/Changed HEP based on:   [] positioning   [] body mechanics   [] transfers   [x] heat/ice application    [] other:     K-tape usage for restoration of pain-free ROM;   k tape removal instructions   Focus on knee extension ROM; avoidance of increase in swelling  Normalized stance for 50/50 WB\"ing on the (B) LE's  TKE in standing  Stretching at home to tolerance  Con't compression sleeve for edema management     Other Objective/Functional Measures:    Great wound / scope site healing   Knee AROM extension before therapy: -6 deg  After MT: -2 deg from neutral  Edema at mid patella: 35.5cm        Pain Level (0-10 scale) post treatment: 2-3    ASSESSMENT/Changes in Function: anterior knee pain/pulling during bridges; not alleviated with changes in foot placement or cues to avoid knee extension moment and to facilitate increased glute max contraction. Pt limited in function today with antalgic gait without device or 2 point gait with 1 AC and decreased WB'ing tolerance on the R LE. Poor quad set therefore re-initiated Estim today for quad control  - pt had good response with quad re-ed in past treatments pre-operatively. Improving edema. Con't to note medial knee pain both with TKE and with knee flexion moment in gait. Patient will continue to benefit from skilled PT services to modify and progress therapeutic interventions, address functional mobility deficits, address ROM deficits, address strength deficits, analyze and address soft tissue restrictions, analyze and cue movement patterns, analyze and modify body mechanics/ergonomics, assess and modify postural abnormalities and address imbalance/dizziness to attain remaining goals.      []  See Plan of Care  []  See progress note/recertification  []  See Discharge Summary         Progress towards goals / Updated goals:  1.  Patient will increase FOTO score to 80/100 for indications of increased functional mobility. Status at last assessment: NT due to website malfunction  Current:   2.  Patient will demo ability to complete forward step down from 6\" step without increased valgus collapse or pain to improve ease with stair negotiation. Status at last assessment: nonreciprocal RLE compensatory pattern with NOEMI crutches  Current: reports increased pain after squatting so held step ups at this time (5/4/22)   3. Patient will ambulate independently for 30 mins for ADLs  Status at last assessment: patient ambulates with NOEMI crutches and TTWB on RLE, RLE demonstrates inc knee flexion throughout gait cycle, dec WB in stance phase   Current: reduction in current ambulation tolerance after last session and increase in pain (5/13/22)  4. Patient will report pain 3/10 at worst for progression to PLOF   Status at last assessment: 0-8/10  Current:  pain higher this week after set-back but overall reducing since Tuesday/wednesday (5/13/22)    PLAN  [x]  Upgrade activities as tolerated     [x]  Continue plan of care  []  Update interventions per flow sheet       []  Discharge due to:_  [x]  Other:_ resume activities to tolerance with caution on squats; gain full ROM, gait training as needed. Consider use of K tape to facilitate non-painful weight bearing activities.     Assess knee flexion response in termial stance phase on R    Carly Randolph, PT 5/12/2022  10:38 AM    Future Appointments   Date Time Provider Joanne Xiao   5/13/2022 11:45 AM Harry Dexter, PT [facility]   5/16/2022 10:45 AM Lisa Serrano [facility]   5/18/2022 10:45 AM Lisa Serrano [facility]

## 2022-05-13 ENCOUNTER — HOSPITAL ENCOUNTER (OUTPATIENT)
Dept: PHYSICAL THERAPY | Age: 37
Discharge: HOME | End: 2022-05-13
Payer: OTHER GOVERNMENT

## 2022-05-13 PROCEDURE — 97110 THERAPEUTIC EXERCISES: CPT

## 2022-05-13 PROCEDURE — 97140 MANUAL THERAPY 1/> REGIONS: CPT

## 2022-05-13 PROCEDURE — 97014 ELECTRIC STIMULATION THERAPY: CPT

## 2022-05-16 ENCOUNTER — HOSPITAL ENCOUNTER (OUTPATIENT)
Dept: PHYSICAL THERAPY | Age: 37
Discharge: HOME | End: 2022-05-16
Payer: OTHER GOVERNMENT

## 2022-05-16 PROCEDURE — 97140 MANUAL THERAPY 1/> REGIONS: CPT

## 2022-05-16 PROCEDURE — 97110 THERAPEUTIC EXERCISES: CPT

## 2022-05-16 PROCEDURE — 97014 ELECTRIC STIMULATION THERAPY: CPT

## 2022-05-16 NOTE — PROGRESS NOTES
PT DAILY TREATMENT NOTE     Patient Name: Terell Bella  Date:2022  : 1985  [x]  Patient  Verified  Payor: VERNELL / Plan: Servando Roth 74 / Product Type:  /    In time:10:47  Out time:11:45  Total Treatment Time (min): 58  Visit #: 7 of 4-8    Medicare/BCBS Only   Total Timed Codes (min):   1:1 Treatment Time:         Treatment Area: Pain in right knee [M25.561]    SUBJECTIVE  Pain Level (0-10 scale): 2  Any medication changes, allergies to medications, adverse drug reactions, diagnosis change, or new procedure performed?: [x] No    [] Yes (see summary sheet for update)  Subjective functional status/changes:   [] No changes reported  \"My knee has continued to feel better and better after that bad flare up I had last week. It does not feel too bad today. \"    OBJECTIVE    Modality rationale: increase muscle contraction/control to improve the patients ability to relax and sleep following therapy session to improve restorative sleep patterns.     Min Type Additional Details    [x] Estim:  []Unatt       []IFC  []Premod                        []Other:  []w/ice   []w/heat  Position:   Location:    10  4 set up [x] Estim: []Att    []TENS instruct  [x]NMES                    [x]Other: 6\" on, 12\" off, 2\" ramp []w/US   []w/ice   []w/heat  Position: reclined  Location: right quadriceps, one electrode overlying VMO    []  Traction: [] Cervical       []Lumbar                       [] Prone          []Supine                       []Intermittent   []Continuous Lbs:  [] before manual  [] after manual    []  Ultrasound: []Continuous   [] Pulsed                           []1MHz   []3MHz W/cm2:  Location:    []  Iontophoresis with dexamethasone         Location: [] Take home patch   [] In clinic    []  Ice     []  heat  []  Ice massage  []  Laser   []  Anodyne Position:   Location:     []  Laser with stim  []  Other:  Position:  Location:    []  Vasopneumatic Device    []  Right     [] Left  Pre-treatment girth:  Post-treatment girth:  Measured at (location):  Pressure:       [] lo [] med [] hi   Temperature: [] lo [] med [] hi   [x] Skin assessment post-treatment:  [x]intact []redness- no adverse reaction    []redness - adverse reaction:       16 min Therapeutic Exercise:  [x] See flow sheet :   Rationale: increase ROM and increase strength to improve the patients ability to perform gait and transfers with improved LE strength and mobility. 28 min Manual Therapy:  Retrograde massage to right quadriceps and peripatellar area performed with assistance from deep prep massage cream; STM to distal right quadriceps; manual stretching into right knee flexion; grade III posterior mobs for increased extension; KT tape to right patella, applied from right lateral patella horizontally to right medial joint line to encourage medial glide   The manual therapy interventions were performed at a separate and distinct time from the therapeutic activities interventions. Rationale: decrease pain, increase ROM, increase tissue extensibility and decrease trigger points to improve ease of ADLs after therapy session. With   [] TE   [] TA   [] neuro   [] other: Patient Education: [x] Review HEP    [] Progressed/Changed HEP based on:   [] positioning   [] body mechanics   [] transfers   [] heat/ice application    [] other:      Other Objective/Functional Measures:   Girth at mid patella pre manual therapy 36.2 cm, after manual therapy 35.4 cm     Pain Level (0-10 scale) post treatment: 2    ASSESSMENT/Changes in Function: Continued highly conservative program as pt continues to note increased irritability of symptoms at today's session. She reports decreased pain with right medial patellar glide applied to even open chain interventions. No increased pain noted with 90 deg extension isometric.     Patient will continue to benefit from skilled PT services to modify and progress therapeutic interventions, address functional mobility deficits, address ROM deficits, address strength deficits, analyze and address soft tissue restrictions, analyze and cue movement patterns, analyze and modify body mechanics/ergonomics, assess and modify postural abnormalities and address imbalance/dizziness to attain remaining goals. []  See Plan of Care  []  See progress note/recertification  []  See Discharge Summary         Progress towards goals / Updated goals:  1.  Patient will increase FOTO score to 80/100 for indications of increased functional mobility. Status at last assessment: NT due to website malfunction  Current:   2.  Patient will demo ability to complete forward step down from 6\" step without increased valgus collapse or pain to improve ease with stair negotiation.   Status at last assessment: nonreciprocal RLE compensatory pattern with NOEMI crutches  Current: reports increased pain after squatting so held step ups at this time (5/4/22)   3. Patient will ambulate independently for 30 mins for ADLs  Status at last assessment: patient ambulates with NOEMI crutches and TTWB on RLE, RLE demonstrates inc knee flexion throughout gait cycle, dec WB in stance phase   Current: progressing, reports improved ambulation without crutches as healing has progressed following exacerbation last week (5/16/22)  4. Patient will report pain 3/10 at worst for progression to PLOF   Status at last assessment: 0-8/10  Current:  pain higher this week after set-back but overall reducing since Tuesday/wednesday (5/13/22)    PLAN  [x]  Upgrade activities as tolerated     [x]  Continue plan of care  []  Update interventions per flow sheet       []  Discharge due to:_  []  Other:_      Susu Otto 5/16/2022  9:06 AM    Future Appointments   Date Time Provider Joanne Xiao   5/16/2022 10:45 [facility]   5/18/2022 10:45 AM [facility]

## 2022-05-18 ENCOUNTER — HOSPITAL ENCOUNTER (OUTPATIENT)
Dept: PHYSICAL THERAPY | Age: 37
Discharge: HOME | End: 2022-05-18
Payer: OTHER GOVERNMENT

## 2022-05-18 PROCEDURE — 97016 VASOPNEUMATIC DEVICE THERAPY: CPT

## 2022-05-18 PROCEDURE — 97110 THERAPEUTIC EXERCISES: CPT

## 2022-05-18 PROCEDURE — 97530 THERAPEUTIC ACTIVITIES: CPT

## 2022-05-18 NOTE — PROGRESS NOTES
PT DAILY TREATMENT NOTE     Patient Name: Darwin Claude  Date:2022  : 1985  [x]  Patient  Verified  Payor: VERNELL / Plan: Servando Roth 74 / Product Type:  /    In time:10:50  Out time:11:45  Total Treatment Time (min): 55  Visit #: 8 of 4-8    Medicare/BCBS Only   Total Timed Codes (min):   1:1 Treatment Time:         Treatment Area: Pain in right knee [M25.561]    SUBJECTIVE  Pain Level (0-10 scale): 2  Any medication changes, allergies to medications, adverse drug reactions, diagnosis change, or new procedure performed?: [x] No    [] Yes (see summary sheet for update)  Subjective functional status/changes:   [] No changes reported  Pt denies adverse reaction to last session's interventions. OBJECTIVE    Modality rationale: decrease inflammation and decrease pain to improve the patients ability to relax and sleep following therapy session to improve restorative sleep patterns.     Min Type Additional Details    [x] Estim:  []Unatt       []IFC  []Premod                        []Other:  []w/ice   []w/heat  Position:   Location:     [] Estim: []Att    []TENS instruct  []NMES                    []Other:  []w/US   []w/ice   []w/heat  Position:  Location:    []  Traction: [] Cervical       []Lumbar                       [] Prone          []Supine                       []Intermittent   []Continuous Lbs:  [] before manual  [] after manual    []  Ultrasound: []Continuous   [] Pulsed                           []1MHz   []3MHz W/cm2:  Location:    []  Iontophoresis with dexamethasone         Location: [] Take home patch   [] In clinic    []  Ice     []  heat  []  Ice massage  []  Laser   []  Anodyne Position:   Location:     []  Laser with stim  []  Other:  Position:  Location:   10 [x]  Vasopneumatic Device    [x]  Right     []  Left  Pre-treatment girth: 36.5 cm  Post-treatment girth: 35.6 cm  Measured at (location): mid patella Pressure:       [] lo [x] med [] hi Temperature: [x] lo [] med [] hi   [x] Skin assessment post-treatment:  [x]intact []redness- no adverse reaction    []redness - adverse reaction:       25 min Therapeutic Exercise:  [x] See flow sheet :   Rationale: increase ROM and increase strength to improve the patients ability to perform gait and transfers with improved LE strength and mobility. 20 min Therapeutic Activity:  [x]  See flow sheet :   Rationale: increase strength, improve coordination, improve balance, and increase proprioception  to improve the patients ability to perform transfers, stair negotiation, and floor <> waist lifts. Patient education: Mamie Alvares, reassessing progress toward goals         With   [] TE   [] TA   [] neuro   [] other: Patient Education: [x] Review HEP    [] Progressed/Changed HEP based on:   [] positioning   [] body mechanics   [] transfers   [] heat/ice application    [] other:      Other Objective/Functional Measures:   Knee AAROM:  Right Knee: 2-131 deg  Left Knee -2-145 deg    LE Strength:   Right (/5) Left (/5)   Hip     Flexion 5 5             Abduction 5 5             Extension 5 5   Knee   Extension 5 5              Flexion 5 5   Ankle   Dorsiflexion 5 5     Gait: unremarkable  Functional Squat: notes increased pain at approximately 30 deg of knee flexion during closed chain squats; also notes increased anterior knee pain with standard bridge  Stair Negotiation: unable to perform stair negotiation with right LE at this time    Pain Level (0-10 scale) post treatment: 2    ASSESSMENT/Changes in Function:   Patient has attended PT for 20 sessions for the treatment of right knee pain, mostly recently completing 8 sessions since right knee arthroscopy with medial plica excision. The patient demonstrates variable progress at this time.  Her range of motion and LE strength remains intact, but persistent right knee effusion and right knee pain restricts closed chain squatting, stair negotiation, and general ease with household chores. She is currently able to tolerate only limited open chain interventions without significant right medial peripatellar pain. Additional assessment includes:  Subjective Gains: improving knee mobility, walking without crutches for household/community distances, no pain when pain/inflammation not flared up  Subjective Deficits: not performing stair negotiation with left LE in weight bearing, increased pain with closed chain weighted knee flexion, reactive effusion present, stiffness in knee  % improvement: 40% since surgery  Pain   Average: 1/10       Best: 0/10     Worst: 4-5/10  Patient Goal: \"be able to squat/step up without pain/swelloing\"      Patient will continue to benefit from skilled PT services to modify and progress therapeutic interventions, address functional mobility deficits, address ROM deficits, address strength deficits, analyze and address soft tissue restrictions, analyze and cue movement patterns, analyze and modify body mechanics/ergonomics, assess and modify postural abnormalities and address imbalance/dizziness to attain remaining goals. []  See Plan of Care  []  See progress note/recertification  []  See Discharge Summary         Progress towards goals / Updated goals:  1.  Patient will increase FOTO score to 80/100 for indications of increased functional mobility. Status at last assessment: NT due to website malfunction  Current: not met, FOTO 53 pts (5/18/22)  2.  Patient will demo ability to complete forward step down from 6\" step without increased valgus collapse or pain to improve ease with stair negotiation.   Status at last assessment: nonreciprocal RLE compensatory pattern with NOEMI crutches  Current: reports increased pain after squatting so held step ups at this time (5/4/22)   3. Patient will ambulate independently for 30 mins for ADLs  Status at last assessment: patient ambulates with NOEMI crutches and TTWB on RLE, RLE demonstrates inc knee flexion throughout gait cycle, dec WB in stance phase   Current: progressing, reports ambulating all distances without AD (5/18/22)  4. Patient will report pain 3/10 at worst for progression to PLOF   Status at last assessment: 0-8/10  Current: progressing, 4-5/10 pain at worst (5/18/22)    PLAN  [x]  Upgrade activities as tolerated     [x]  Continue plan of care  []  Update interventions per flow sheet       []  Discharge due to:_  []  Other:_      Bedelia Spikes 5/18/2022  9:03 AM    Future Appointments   Date Time Provider Joanne Xiao   5/18/2022 10:45 AM Taylor Moeller [facility]   5/25/2022  7:45 AM Ha Patrick, PT [facility]   5/31/2022 12:30 PM Frieda Hartmann PTA [facility]   6/3/2022  8:45 AM [facility]   6/6/2022 10:00 AM [facility]   6/8/2022 10:00 AM Elissa Lira PT [facility]

## 2022-05-18 NOTE — PROGRESS NOTES
[facility] AT [facility]   [id number] [place] [address] [address] [place] [address]  Phone: [phone] Fax: [phone]  PROGRESS NOTE  Patient Name: Geovanna Maddox : 1985   Treatment/Medical Diagnosis: Pain in right knee [M25.561]   Referral Source: Neil Del Rio MD     Date of Initial Visit: 22 Attended Visits: 20 Missed Visits: 1     SUMMARY OF TREATMENT  Pt is a 39y.o. year old female who presents with co R medial knee pain S/P knee arthroscopy DOS 22. Treatment has consisted of: therapeutic exercise to improve LE/lumbar strength/mobility; therapeutic activities to improve transfer/lift/carry ability; neuromuscular re-education to improve balance, core stability, neuromuscular control with lifting; physical agent/modality for symptom management; manual therapy for symptom relief and muscle flexibility; patient education to improve symptom management; self Care training; home safety training; stair training, and functional mobility training. CURRENT STATUS  Patient has attended PT for 20 sessions for the treatment of right knee pain, mostly recently completing 8 sessions since right knee arthroscopy with medial plica excision. The patient demonstrates variable progress at this time. Her range of motion and LE strength remains intact, but persistent right knee effusion and right knee pain restricts closed chain squatting, stair negotiation, and general ease with household chores. She is currently able to tolerate only limited open chain interventions without significant right medial peripatellar pain.  Additional assessment includes:  Subjective Gains: improving knee mobility, walking without crutches for household/community distances, no pain when pain/inflammation not flared up  Subjective Deficits: not performing stair negotiation with left LE in weight bearing, increased pain with closed chain weighted knee flexion, reactive effusion present, stiffness in knee  % improvement: 40% since surgery  Pain   Average: 1/10                     Best: 0/10                   Worst: 4-5/10  Patient Goal: \"be able to squat/step up without pain/swelloing\"  Objective Measures:   Knee AAROM:  Right Knee: 2-131 deg  Left Knee -2-145 deg     LE Strength:    Right (/5) Left (/5)   Hip     Flexion 5 5             Abduction 5 5             Extension 5 5   Knee   Extension 5 5              Flexion 5 5   Ankle   Dorsiflexion 5 5      Gait: unremarkable  Functional Squat: notes increased pain at approximately 30 deg of knee flexion during closed chain squats; also notes increased anterior knee pain with standard bridge  Stair Negotiation: unable to perform stair negotiation with right LE at this time      Current Goals:  1.  Patient will increase FOTO score to 80/100 for indications of increased functional mobility. Status at last assessment: NT due to website malfunction  Current: not met, FOTO 53 pts (5/18/22)  2.  Patient will demo ability to complete forward step down from 6\" step without increased valgus collapse or pain to improve ease with stair negotiation. Status at last assessment: nonreciprocal RLE compensatory pattern with NOEMI crutches  Current: reports increased pain after squatting so held step ups at this time (5/4/22)   3. Patient will ambulate independently for 30 mins for ADLs  Status at last assessment: patient ambulates with NOEMI crutches and TTWB on RLE, RLE demonstrates inc knee flexion throughout gait cycle, dec WB in stance phase   Current: progressing, reports ambulating all distances without AD (5/18/22)  4. Patient will report pain 3/10 at worst for progression to PLOF   Status at last assessment: 0-8/10  Current: progressing, 4-5/10 pain at worst (5/18/22)        New Goals to be achieved in __10__  treatments:  1.  Patient will increase FOTO score to 80/100 for indications of increased functional mobility.    Status at last assessment: FOTO 53 pts 2.  Patient will demo ability to complete forward step down from 6\" step without increased valgus collapse or pain to improve ease with stair negotiation. Status at last assessment: unable to complete step up/down with left LE weight bearing  3. Patient will demo right knee AROM of -2-140 deg to improve ease with deep squatting and gait. Status at last assessment: 2-131 deg  4. Patient will report pain 3/10 at worst for progression to PLOF   Status at last assessment: 4-5/10 pain at worst     RECOMMENDATIONS  Pt to continue with skilled therapy for 2x/week for 10 sessions to improve ability to squat, perform stair negotiation, and lunge without pain. If you have any questions/comments please contact us directly at [phone]   Thank you for allowing us to assist in the care of your patient. Therapist Signature: Debbie Reyes Date: 5/18/2022   Reporting Period  4/23/22-5/18/22 Time: 4:54 PM   NOTE TO PHYSICIAN:  PLEASE COMPLETE THE ORDERS BELOW AND FAX TO   [facility]: [phone]. If you are unable to process this request in 24 hours please contact our office: [phone].  ___ I have read the above report and request that my patient continue as recommended.   ___ I have read the above report and request that my patient continue therapy with the following changes/special instructions:_________________________________________________________   ___ I have read the above report and request that my patient be discharged from therapy.      Physician Signature:        Date:       Time:                                                        Violeta Ocamop MD.

## 2022-05-24 ENCOUNTER — HOSPITAL ENCOUNTER (OUTPATIENT)
Dept: PHYSICAL THERAPY | Age: 37
Discharge: HOME | End: 2022-05-24
Payer: OTHER GOVERNMENT

## 2022-05-24 PROCEDURE — 97110 THERAPEUTIC EXERCISES: CPT

## 2022-05-24 PROCEDURE — 97140 MANUAL THERAPY 1/> REGIONS: CPT

## 2022-05-24 PROCEDURE — 97014 ELECTRIC STIMULATION THERAPY: CPT

## 2022-05-24 NOTE — PROGRESS NOTES
PT DAILY TREATMENT NOTE     Patient Name: Toby Ortiz  Date:2022  : 1985  [x]  Patient  Verified  Payor: VERNELL / Plan: Servando Roth 74 / Product Type:  /    In time: 10:00 am         Out time: 10:55 am  Total Treatment Time (min): 55  Visit #: 1 of 10    Medicare/BCBS Only   Total Timed Codes (min):   1:1 Treatment Time:         Treatment Area: Pain in right knee [M25.561]    SUBJECTIVE  Pain Level (0-10 scale): 2  Any medication changes, allergies to medications, adverse drug reactions, diagnosis change, or new procedure performed?: [x] No    [] Yes (see summary sheet for update)  Subjective functional status/changes:   [] No changes reported  Patient reports increased whole body discomfort she attributes to awkward positioning while sleeping beside her children. She states she is considering seeing a chiropractor. OBJECTIVE  Modality rationale: increase muscle contraction/control to improve the patients ability to ambulate with normalized gait pattern   Min Type Additional Details   10 [x] Estim:  [x]Unatt       []IFC  [x]NMES, co-contract with active quad set                   []Other:  [x]w/ice   []w/heat  Position: supine with rolled towel under the right knee  Location: (R) quadriceps with one electrode on the VMO    []  Ice     []  Heat   Position:   Location:     []  Vasopneumatic Device    []  Right     []  Left  Pre-treatment girth:   Post-treatment girth:   Measured at (location): mid-patella Pressure:       [] lo [x] med [] hi   Temperature: [x] lo [] med [] hi   [x] Skin assessment post-treatment:  [x]intact []redness- no adverse reaction    []redness - adverse reaction:       34 min Therapeutic Exercise:  [x] See flow sheet: added open books using FR   Rationale: increase ROM and increase strength to improve the patients ability to perform gait and transfers with improved LE strength and mobility.     0 min Therapeutic Activity:  [x]  See flow sheet: NT   Rationale: increase strength, improve coordination, improve balance, and increase proprioception  to improve the patients ability to perform transfers, stair negotiation, and floor <> waist lifts. Patient education: Dalia Agrawal, reassessing progress toward goals    11 min Manual Therapy:  SI check - seemingly (R) upslip; massage gun STM to (L) glut f/b prone PA mobs to the T/S grade III/IV (no cavitation present); (R) hip flexor release   Rationale: decrease edema  to improve the patients ability to perform ADLs. The manual therapy interventions were performed at a separate and distinct time from the therapeutic activities interventions. With   [x] TE   [x] TA   [] neuro   [] other: Patient Education: [x] Review HEP; piriformis stretch, mainly on the (L) hip f/b hip flexor stretching bilaterally and holger with mild hip ER     Other Objective/Functional Measures:   Holger - VCs to avoid hip ADD and for mild hip ER to reduce sartorius compensation (active insufficiency)    Pain Level (0-10 scale) post treatment: 0    ASSESSMENT/Changes in Function:   Patient with (L) SIJ hypomobility possibly affecting tolerance to squatting attempts. Encouraged hip stretching to improve comfort with squatting as patient with significantly limited mobility of the (L) hip for rotation, possibly provoking compensatory movement patterns onto the (R) knee. Patient will continue to benefit from skilled PT services to modify and progress therapeutic interventions, address functional mobility deficits, address ROM deficits, address strength deficits, analyze and address soft tissue restrictions, analyze and cue movement patterns, analyze and modify body mechanics/ergonomics, assess and modify postural abnormalities and address imbalance/dizziness to attain remaining goals.      []  See Plan of Care  []  See progress note/recertification  []  See Discharge Summary         Progress towards goals / Updated goals:  1.  Patient will increase FOTO score to 80/100 for indications of increased functional mobility. Status at last assessment: NT due to website malfunction  Current: not met, FOTO 53 pts (5/18/22)  2.  Patient will demo ability to complete forward step down from 6\" step without increased valgus collapse or pain to improve ease with stair negotiation.   Status at last assessment: nonreciprocal RLE compensatory pattern with NOEMI crutches  Current: reports increased pain after squatting so held step ups at this time (5/4/22)   3. Patient will ambulate independently for 30 mins for ADLs  Status at last assessment: patient ambulates with NOEMI crutches and TTWB on RLE, RLE demonstrates inc knee flexion throughout gait cycle, dec WB in stance phase   Current: progressing, reports ambulating all distances without AD (5/18/22)  4. Patient will report pain 3/10 at worst for progression to PLOF   Status at last assessment: 0-8/10  Current: progressing, 4-5/10 pain at worst (5/18/22)    PLAN  [x]  Upgrade activities as tolerated     [x]  Continue plan of care  []  Update interventions per flow sheet       []  Discharge due to:_  [x]  Other: assess effect of progressed HEP on ease with squatting, walking, and transfers      Jersey Moya PTA 5/24/2022    Future Appointments   Date Time Provider Joanne Xiao   5/24/2022 10:00 AM [facility]   5/25/2022  7:45 AM Jerald Christy PT [facility]   6/1/2022  3:30 PM Yael James PTA [facility]   6/3/2022  8:45 AM [facility]   6/6/2022 10:00 AM [facility]   6/8/2022 10:00 AM Vishal Gutierrez, PT [facility]

## 2022-05-25 ENCOUNTER — HOSPITAL ENCOUNTER (OUTPATIENT)
Dept: PHYSICAL THERAPY | Age: 37
Discharge: HOME | End: 2022-05-25
Payer: OTHER GOVERNMENT

## 2022-05-25 PROCEDURE — 97110 THERAPEUTIC EXERCISES: CPT

## 2022-05-25 PROCEDURE — 97140 MANUAL THERAPY 1/> REGIONS: CPT

## 2022-05-25 PROCEDURE — 97014 ELECTRIC STIMULATION THERAPY: CPT

## 2022-05-25 NOTE — PROGRESS NOTES
PT DAILY TREATMENT NOTE     Patient Name: Dhruv Arrant  Date:2022  : 1985  [x]  Patient  Verified  Payor:  / Plan: Kailey Kennedyer / Product Type:  /    In time: 747      Out [id number]  Total Treatment Time (min): 64  Visit #: 2 of 10      Treatment Area: Pain in right knee [M25.561]    SUBJECTIVE  Pain Level (0-10 scale): 1  Any medication changes, allergies to medications, adverse drug reactions, diagnosis change, or new procedure performed?: [x] No    [] Yes (see summary sheet for update)  Subjective functional status/changes:   [] No changes reported  Patient reports  She is slowly progressing after set back last week. OBJECTIVE  Modality rationale: increase muscle contraction/control to improve the patients ability to ambulate with normalized gait pattern   Min Type Additional Details   10 [x] Estim:  [x]Unatt       []IFC  [x]NMES, co-contract with active quad set                   []Other:  [x]w/ice   []w/heat  Position: supine   Location: (R) quadriceps   [x] Skin assessment post-treatment:  [x]intact []redness- no adverse reaction    []redness - adverse reaction:       40 min Therapeutic Exercise:  [x] See flow sheet:    Rationale: increase ROM and increase strength to improve the patients ability to perform gait and transfers with improved LE strength and mobility. 14 min Manual Therapy:  retrograde massage, gentle grade 3-4 joint mobs for knee flexion    Rationale: decrease edema  to improve the patients ability to perform ADLs. The manual therapy interventions were performed at a separate and distinct time from the therapeutic activities interventions.          With rx Patient Education: [x] Review HEP : body mechanics,      Other Objective/Functional Measures:   AROM  0-138 : after manual   Alignment check - all symm    Performed Gaylin Moment in supine and seated to work hip flexor and quad in multiple positions    Functional progression - added step up for step over step progression     Pain Level (0-10 scale) post treatment: 0    ASSESSMENT/Changes in Function:   Patient presents with lowest amount of pain in a while. Swelling still present . B squinting patella still present. Evident quad weakness with holger in seated vs supine. Patient encouraged to schedule more apts . Patient notes \"wobblling\" with static step up - likely sec to edema. VCing for proper alignment and body mechanics. Cont NMES for quad inhibition deficits     Patient will continue to benefit from skilled PT services to modify and progress therapeutic interventions, address functional mobility deficits, address ROM deficits, address strength deficits, analyze and address soft tissue restrictions, analyze and cue movement patterns, analyze and modify body mechanics/ergonomics, assess and modify postural abnormalities and address imbalance/dizziness to attain remaining goals. []  See Plan of Care  [x]  See progress note/recertification 8/09  []  See Discharge Summary         Progress towards goals / Updated goals:  1.  Patient will increase FOTO score to 80/100 for indications of increased functional mobility. Status at last assessment: NT due to website malfunction  Current: slow progress to return to function at this time 5/25/2022  2.  Patient will demo ability to complete forward step down from 6\" step without increased valgus collapse or pain to improve ease with stair negotiation.   Status at last assessment: nonreciprocal RLE compensatory pattern with NOEMI crutches  Current:   3. Patient will ambulate independently for 30 mins for ADLs  Status at last assessment: patient ambulates with NOEMI crutches and TTWB on RLE, RLE demonstrates inc knee flexion throughout gait cycle, dec WB in stance phase   Current:   4. Patient will report pain 3/10 at worst for progression to PLOF   Status at last assessment: 0-8/10  Current:   PN DUE 6/18    PLAN  [x]  Upgrade activities as tolerated [x]  Continue plan of care  []  Update interventions per flow sheet       []  Discharge due to:_  [x]  Other: Add hip ADD stretch sec to squinting patella     Ramya Standing, PT 5/25/2022    Future Appointments   Date Time Provider Joanne Xiao   5/25/2022  7:45 AM Angelina Forrest, PT [facility]   6/1/2022  3:30 PM Thea Alvarado, PTA [facility]   6/3/2022  8:45 AM [facility]   6/6/2022 10:00 AM [facility]   6/8/2022 10:00 AM Mady Swift, PT [facility]

## 2022-05-31 ENCOUNTER — APPOINTMENT (OUTPATIENT)
Dept: PHYSICAL THERAPY | Age: 37
End: 2022-05-31
Payer: OTHER GOVERNMENT

## 2022-06-01 ENCOUNTER — HOSPITAL ENCOUNTER (OUTPATIENT)
Dept: PHYSICAL THERAPY | Age: 37
Discharge: HOME | End: 2022-06-01
Payer: OTHER GOVERNMENT

## 2022-06-01 PROCEDURE — 97110 THERAPEUTIC EXERCISES: CPT

## 2022-06-01 PROCEDURE — 97014 ELECTRIC STIMULATION THERAPY: CPT

## 2022-06-01 PROCEDURE — 97140 MANUAL THERAPY 1/> REGIONS: CPT

## 2022-06-01 NOTE — PROGRESS NOTES
PT DAILY TREATMENT NOTE     Patient Name: Dhruv Arrant  Date:2022  : 1985  [x]  Patient  Verified  Payor:  / Plan: Kailey  / Product Type:  /    In time: 3:30 pm         Out time: 4:19 pm  Total Treatment Time (min): 49  Visit #: 3 of 10      Treatment Area: Pain in right knee [M25.561]    SUBJECTIVE  Pain Level (0-10 scale): 0 - stiffness  Any medication changes, allergies to medications, adverse drug reactions, diagnosis change, or new procedure performed?: [x] No    [] Yes (see summary sheet for update)  Subjective functional status/changes:   [] No changes reported  Patient notes f/u with surgeon tomorrow. Notes she is having less pain despite walking to the beach the other day and generally being more active. OBJECTIVE  Modality rationale: increase muscle contraction/control to improve the patients ability to ambulate with normalized gait pattern   Min Type Additional Details   10 [x] Estim:  [x]Unatt       []IFC  [x]NMES, co-contract with active quad set                   []Other:  [x]w/ice   []w/heat  Position: supine   Location: (R) quadriceps   [x] Skin assessment post-treatment:  [x]intact []redness- no adverse reaction    []redness - adverse reaction:       29 min Therapeutic Exercise:  [x] See flow sheet: added hip adductor stretching   Rationale: increase ROM and increase strength to improve the patients ability to perform gait and transfers with improved LE strength and mobility. 10 min Manual Therapy:  retrograde massage, scar massage; gentle grade 3-4 joint mobs for (R) knee flexion; tibial IR for flexion with PROM   Rationale: decrease edema  to improve the patients ability to perform ADLs. The manual therapy interventions were performed at a separate and distinct time from the therapeutic activities interventions.          With rx Patient Education: [x] Review HEP; added adductor stretching (preferred in standing) and prone HS curl with eccentric return; continue with previous HEP stretching      Other Objective/Functional Measures:       Pain Level (0-10 scale) post treatment: 0    ASSESSMENT/Changes in Function:   Patient req min cueing for cognizant hip ER/ABD with all static lunge attempts. Good response to tibial IR mobs to reduce c/o hip \"popping\" with knee flexion AAROM. Patient possibly with increased pull on the sartorius due to tibial ER positioning at rest causing popping with knee flexion AAROM attempts. Patient will continue to benefit from skilled PT services to modify and progress therapeutic interventions, address functional mobility deficits, address ROM deficits, address strength deficits, analyze and address soft tissue restrictions, analyze and cue movement patterns, analyze and modify body mechanics/ergonomics, assess and modify postural abnormalities and address imbalance/dizziness to attain remaining goals. []  See Plan of Care  [x]  See progress note/recertification 7/24  []  See Discharge Summary         Progress towards goals / Updated goals:  1.  Patient will increase FOTO score to 80/100 for indications of increased functional mobility. Status at last assessment: NT due to website malfunction  Current: slow progress to return to function at this time 5/25/2022  2.  Patient will demo ability to complete forward step down from 6\" step without increased valgus collapse or pain to improve ease with stair negotiation.   Status at last assessment: nonreciprocal RLE compensatory pattern with NOEMI crutches  Current:   3. Patient will ambulate independently for 30 mins for ADLs  Status at last assessment: patient ambulates with NOEMI crutches and TTWB on RLE, RLE demonstrates inc knee flexion throughout gait cycle, dec WB in stance phase   Current: goal progressing; pt notes ability to walk at beach independently with minimal discomfort (6/1/22)  4. Patient will report pain 3/10 at worst for progression to PLOF   Status at last assessment: 0-8/10  Current:   PN DUE 6/18    PLAN  [x]  Upgrade activities as tolerated     [x]  Continue plan of care  []  Update interventions per flow sheet       []  Discharge due to:_  [x]  Other: consider NMES with eccentric SAQ NV; assess response to progression of program towards strengthening in CKC positions    Tyler Davenport PTA 6/1/2022    Future Appointments   Date Time Provider Joanne Xiao   6/1/2022  3:30 PM [facility]   6/3/2022  8:45 AM [facility]   6/6/2022 10:00 AM [facility]   6/8/2022 10:00 AM Zunilda Mccormick PT [facility]

## 2022-06-03 ENCOUNTER — HOSPITAL ENCOUNTER (OUTPATIENT)
Dept: PHYSICAL THERAPY | Age: 37
Discharge: HOME | End: 2022-06-03
Payer: OTHER GOVERNMENT

## 2022-06-03 PROCEDURE — 97110 THERAPEUTIC EXERCISES: CPT | Performed by: PHYSICAL THERAPIST

## 2022-06-03 PROCEDURE — 97014 ELECTRIC STIMULATION THERAPY: CPT | Performed by: PHYSICAL THERAPIST

## 2022-06-06 ENCOUNTER — HOSPITAL ENCOUNTER (OUTPATIENT)
Dept: PHYSICAL THERAPY | Age: 37
Discharge: HOME | End: 2022-06-06
Payer: OTHER GOVERNMENT

## 2022-06-06 PROCEDURE — 97110 THERAPEUTIC EXERCISES: CPT

## 2022-06-06 PROCEDURE — 97140 MANUAL THERAPY 1/> REGIONS: CPT

## 2022-06-06 PROCEDURE — 97014 ELECTRIC STIMULATION THERAPY: CPT

## 2022-06-06 NOTE — PROGRESS NOTES
PT DAILY TREATMENT NOTE     Patient Name: Stan Gonzales  Date:2022  : 1985  [x]  Patient  Verified  Payor: VERNELL / Plan: Servando Roth 74 / Product Type:  /    In time:1001 am  Out time:1056  Total Treatment Time (min): 55  Visit #: 5 of 10    Medicare/BCBS Only   Total Timed Codes (min):  39 1:1 Treatment Time:  n/a       Treatment Area: Pain in right knee [M25.561]    SUBJECTIVE  Pain Level (0-10 scale): 2/10  Any medication changes, allergies to medications, adverse drug reactions, diagnosis change, or new procedure performed?: [x] No    [] Yes (see summary sheet for update)  Subjective functional status/changes:   [] No changes reported  \"it's okay. \" \"it's a little more painful today than it has been\"  Pt reports she did some workouts over the weekend.    \"I've been trying to do the stairs more\"    OBJECTIVE    Modality rationale: decrease inflammation and decrease pain to improve the patients ability to perform LE functional mobility, transfers, and ambulation with greater ease   Min Type Additional Details    [] Estim:  []Unatt       []IFC  []Premod                        []Other:  []w/ice   []w/heat  Position:   Location:    10' + 6' set up/removal [x] Estim: []Att    []TENS instruct  [x]NMES                    [x]Other: with quad set   8 seconds on/ 5 seconds off []w/US   [x]w/ice   []w/heat  Position: supine  Location: Right quadriceps     []  Traction: [] Cervical       []Lumbar                       [] Prone          []Supine                       []Intermittent   []Continuous Lbs:  [] before manual  [] after manual    []  Ultrasound: []Continuous   [] Pulsed                           []1MHz   []3MHz W/cm2:  Location:    []  Iontophoresis with dexamethasone         Location: [] Take home patch   [] In clinic    []  Ice     []  heat  []  Ice massage  []  Laser   []  Anodyne Position:   Location:     []  Laser with stim  []  Other:  Position:  Location:    [] Vasopneumatic Device    []  Right     []  Left  Pre-treatment girth:  Post-treatment girth:  Measured at (location):  Pressure:       [] lo [] med [] hi   Temperature: [] lo [] med [] hi   [x] Skin assessment post-treatment:  [x]intact []redness- no adverse reaction    []redness - adverse reaction:       30 min Therapeutic Exercise:  [x] See flow sheet :   Rationale: increase ROM and increase strength to improve the patients ability to perform gait and transfers with improved LE strength and mobility. 9 min Manual Therapy:  Right LE: STM distal adductors; patellar mobs all directions; scar tissue massage; retrograde massage for swelling,  TF mobs anterior/posterior for flexion/extension grade III   The manual therapy interventions were performed at a separate and distinct time from the therapeutic activities interventions. Rationale: decrease pain, increase ROM, increase tissue extensibility and decrease trigger points to improve ease of ADLs and functional activities. With   [x] TE   [] TA   [] neuro   [] other: Patient Education: [x] Review HEP    [] Progressed/Changed HEP based on:   [] positioning   [] body mechanics   [] transfers   [] heat/ice application    [] other:      Other Objective/Functional Measures:   Pt not currently using crutches- she reports she has been leading with her good leg (6/6/22) progress     Pt reports next MD follow up in 6 weeks. Pain Level (0-10 scale) post treatment: 3/10    ASSESSMENT/Changes in Function: Patient able to perform 4 repetitions of lateral lunges with weights before eliminating resistance so as to avoid increased soreness in right knee (\"I probably could have finished but my  is out of town\"). Skilled verbal cues provided to perform fwd lunges with proper form- upright posture and LE placement.      Patient will continue to benefit from skilled PT services to modify and progress therapeutic interventions, address functional mobility deficits, address ROM deficits, address strength deficits, analyze and address soft tissue restrictions, analyze and cue movement patterns, analyze and modify body mechanics/ergonomics, assess and modify postural abnormalities and address imbalance/dizziness to attain remaining goals. []  See Plan of Care  []  See progress note/recertification  []  See Discharge Summary         Progress towards goals / Updated goals: PN DUE 6/18  1.  Patient will increase FOTO score to 80/100 for indications of increased functional mobility. Status at last assessment: NT due to website malfunction  Current: slow progress to return to function at this time 5/25/2022  2.  Patient will demo ability to complete forward step down from 6\" step without increased valgus collapse or pain to improve ease with stair negotiation.   Status at last assessment: nonreciprocal RLE compensatory pattern with NOEMI crutches  Current: pt not currently using crutches- she reports she has been leading with her good leg (6/6/22) progressing  3. Patient will ambulate independently for 30 mins for ADLs  Status at last assessment: patient ambulates with NOEMI crutches and TTWB on RLE, RLE demonstrates inc knee flexion throughout gait cycle, dec WB in stance phase   Current: goal progressing; pt notes ability to walk at beach independently with minimal discomfort (6/1/22)  4. Patient will report pain 3/10 at worst for progression to PLOF   Status at last assessment: 0-8/10  Current: 2-3 but more stiffness 6/3/22    PLAN  [x]  Upgrade activities as tolerated     [x]  Continue plan of care  []  Update interventions per flow sheet       []  Discharge due to:_  []  Other:_      Sailaja Westbrook PT 6/6/2022 11:50 AM     Future Appointments   Date Time Provider Joanne Xiao   6/8/2022 10:00 AM Zunilda Mccormick PT [facility]

## 2022-06-08 ENCOUNTER — HOSPITAL ENCOUNTER (OUTPATIENT)
Dept: PHYSICAL THERAPY | Age: 37
Discharge: HOME | End: 2022-06-08
Payer: OTHER GOVERNMENT

## 2022-06-08 PROCEDURE — 97110 THERAPEUTIC EXERCISES: CPT

## 2022-06-08 PROCEDURE — 97140 MANUAL THERAPY 1/> REGIONS: CPT

## 2022-06-08 PROCEDURE — 97014 ELECTRIC STIMULATION THERAPY: CPT

## 2022-06-08 NOTE — PROGRESS NOTES
PT DAILY TREATMENT NOTE     Patient Name: Stan Gonzales  Date:2022  : 1985  [x]  Patient  Verified  Payor: VERNELL / Plan: Servando Roth 74 / Product Type:  /    In time:10:01 am  Out time:10:57  Total Treatment Time (min): 56  Visit #: 6 of 10    Treatment Area: Pain in right knee [M25.561]    SUBJECTIVE  Pain Level (0-10 scale): 1  Any medication changes, allergies to medications, adverse drug reactions, diagnosis change, or new procedure performed?: [x] No    [] Yes (see summary sheet for update)  Subjective functional status/changes:   [] No changes reported  \"i'm good. I'm good. I know this is slower than I hoped or intended. I'm sore in the spot that was 10/10 pain a few weeks ago. Maybe a muscle. It's sore with certain movements - walking this morning. It's the same movements as pre surgery, the final 5-10 deg of extension just get it irritated. Coming out of bend really gets it. \"    Pt notes  out of town - unsure when he returns from [place]. Pt unsure when she can return to PT in the near future due to no  options and kids out of school. Pt notes doing little workouts at home and that's going well. Biking outdoor - no issues noted. Notes mm tone in the quad is coming back.    Confirmed 22 MD appt      Notes some snapping - points to distal VMO    OBJECTIVE    Modality rationale: decrease inflammation and decrease pain to improve the patients ability to perform LE functional mobility, transfers, and ambulation with greater ease   Min Type Additional Details   10' + 2' set up/removal [x] Estim: []Att    []TENS instruct  [x]NMES                    [x]Other: with quad set   8 seconds on/ 5 seconds off []w/US   [x]w/ice   []w/heat  Position: supine  Location: Right quadriceps  Towel roll under ankle for extension hang    [x] Skin assessment post-treatment:  [x]intact []redness- no adverse reaction    []redness - adverse reaction: 34 min Therapeutic Exercise:  [x] See flow sheet :  -increased holger to 2x10 with 2\" hold  -Bear crawl: static hold 3x10\"; latearl bear crawls 10 feet each    Rationale: increase ROM and increase strength to improve the patients ability to perform gait and transfers with improved LE strength and mobility. 10 min Manual Therapy:  Right LE: STM distal adductors and IASTM to distal VMO fibers; patellar mobs all directions ni superior; CFM to patellar tendon; scar tissue massage; retrograde massage for swelling,  TF mobs extension grade III and PT overpressure     The manual therapy interventions were performed at a separate and distinct time from the therapeutic activities interventions. Rationale: decrease pain, increase ROM, increase tissue extensibility and decrease trigger points to improve ease of ADLs and functional activities. With   [x] TE   [] TA   [] neuro   [] other: Patient Education: [x] Review HEP    [] Progressed/Changed HEP based on:   [] positioning   [] body mechanics   [] transfers   [] heat/ice application    [] other:     Supine extension PROm with weight - extension hang  CFM to patellar tendon  Superior patellar glides   Shoewear recommendations to be seen at [address] [place] [address]. Other Objective/Functional Measures:   Edema at midpatella 36cm (B)      Pain Level (0-10 scale) post treatment: 0-1    ASSESSMENT/Changes in Function: Pt lacking full extension which can account for pain and limitations with TKE for gait and stair climbing. Attempted lateral step down increase to 6\" but pt can't tolerate that without medial peripatellar pain. She demo's good form, however, with step downs, noting improved glute and quad control. Stair climbing still demo's decreased speed/increased cognitive load needed for form with ascent and descent. Notes R knee pain medial knee with R leg in retro position for forward lunges.        Patient will continue to benefit from skilled PT services to modify and progress therapeutic interventions, address functional mobility deficits, address ROM deficits, address strength deficits, analyze and address soft tissue restrictions, analyze and cue movement patterns, analyze and modify body mechanics/ergonomics, assess and modify postural abnormalities and address imbalance/dizziness to attain remaining goals. []  See Plan of Care  []  See progress note/recertification  []  See Discharge Summary         Progress towards goals / Updated goals: PN DUE 6/18  1.  Patient will increase FOTO score to 80/100 for indications of increased functional mobility. Status at last assessment: NT due to website malfunction  Current: slow progress to return to function at this time 5/25/2022  2.  Patient will demo ability to complete forward step down from 6\" step without increased valgus collapse or pain to improve ease with stair negotiation.   Status at last assessment: nonreciprocal RLE compensatory pattern with NOEMI crutches  Current: pt notes minimal discomfort with TKE for ascent and lacking strength for controlled descent (6/8/22)  3. Patient will ambulate independently for 30 mins for ADLs  Status at last assessment: patient ambulates with NOEMI crutches and TTWB on RLE, RLE demonstrates inc knee flexion throughout gait cycle, dec WB in stance phase   Current: walk at beach independently with minimal discomfort and no crutches in several weeks (6/8/22)  4. Patient will report pain 3/10 at worst for progression to PLOF   Status at last assessment: 0-8/10  Current: 2-3 but more stiffness 6/3/22    PLAN  [x]  Upgrade activities as tolerated     [x]  Continue plan of care  []  Update interventions per flow sheet       []  Discharge due to:_  [x]  Other:_PN likely due NV; assess pt's new [address] [place] [address], PT 6/8/2022 11:50 AM     Future Appointments   Date Time Provider Joanne Xiao   6/8/2022 10:00 AM Vishal Gutierrez, PT [facility]

## 2022-06-27 ENCOUNTER — HOSPITAL ENCOUNTER (OUTPATIENT)
Dept: PHYSICAL THERAPY | Age: 37
Discharge: HOME | End: 2022-06-27
Payer: OTHER GOVERNMENT

## 2022-06-27 PROCEDURE — 97140 MANUAL THERAPY 1/> REGIONS: CPT

## 2022-06-27 PROCEDURE — 97110 THERAPEUTIC EXERCISES: CPT

## 2022-06-27 NOTE — PROGRESS NOTES
PT DAILY TREATMENT NOTE     Patient Name: Aden Magaña  Date:2022  : 1985  [x]  Patient  Verified  Payor: VERNELL / Plan: Servadno Roth [address] / Product Type:  /    In time: 11:30 am        Out time: 12:23 am  Total Treatment Time (min): 53  Visit #: 7 of 10    Treatment Area: Pain in right knee [M25.561]    SUBJECTIVE  Pain Level (0-10 scale): 1  Any medication changes, allergies to medications, adverse drug reactions, diagnosis change, or new procedure performed?: [x] No    [] Yes (see summary sheet for update)  Subjective functional status/changes:   [] No changes reported  Patient notes being able to attend PT more regularly due to her  being back in town for the next month. OBJECTIVE    Modality rationale: decrease inflammation and decrease pain to improve the patients ability to perform LE functional mobility, transfers, and ambulation with greater ease   Min Type Additional Details   NT [] Estim: []Att    []TENS instruct  []NMES                    []Other: with quad set   8 seconds on/ 5 seconds off []w/US   []w/ice   []w/heat  Position:   Location:   [] Skin assessment post-treatment:  []intact []redness- no adverse reaction    []redness - adverse reaction:       39 min Therapeutic Exercise:  [x] See flow sheet: assisted patient with FOTO completion   Rationale: increase ROM and increase strength to improve the patients ability to perform gait and transfers with improved LE strength and mobility. 14 min Manual Therapy: (R) LE traction hold f/b piriformis TrP release and PA mobs to the (R) SIJ; reassessment     The manual therapy interventions were performed at a separate and distinct time from the therapeutic activities interventions. Rationale: decrease pain, increase ROM, increase tissue extensibility and decrease trigger points to improve ease of ADLs and functional activities.            With   [x] TE   [] TA   [] neuro   [] other: Patient Education: [x] Review HEP    [] Progressed/Changed HEP based on:   [] positioning   [] body mechanics   [] transfers   [] heat/ice application    [x] other:      Shoewear recommendations (x2) to be seen at [address] [place] [address]. Other Objective/Functional Measures:   Improvements: perceived strength, general mobility  Deficits: pain with last 10-15 degrees of TKE limiting ease with ascending stairs,   FOTO score: 60/100  (R) knee AROM: 0-155 deg, with medial joint line pain at end-range flexion  (R) knee strength, via circumferential girth measurement, 2.5 inch superior to base of patella: 38.4 cm (for reference, left 40 cm)  (R) hip strength: flex 5/5, abd 5/5, ext 5/5  Core strength: 90% bridge  Hip AROM: ER (R) 35 deg, (L) 34 deg; IR (B) 34 deg  Pain: (B) 0, (W) 3-4        Pain Level (0-10 scale) post treatment: 0    ASSESSMENT/Changes in Function:   See PN. Patient will continue to benefit from skilled PT services to modify and progress therapeutic interventions, address functional mobility deficits, address ROM deficits, address strength deficits, analyze and address soft tissue restrictions, analyze and cue movement patterns, analyze and modify body mechanics/ergonomics, assess and modify postural abnormalities and address imbalance/dizziness to attain remaining goals. []  See Plan of Care  [x]  See progress note/recertification  []  See Discharge Summary         Progress towards goals / Updated goals:   1.  Patient will increase FOTO score to 80/100 for indications of increased functional mobility. Status at last assessment: NT due to website malfunction  Current: slow progress to return to function at this time 5/25/2022  2.  Patient will demo ability to complete forward step down from 6\" step without increased valgus collapse or pain to improve ease with stair negotiation.   Status at last assessment: nonreciprocal RLE compensatory pattern with NOEMI crutches  Current: pt notes minimal discomfort with TKE for ascent and lacking strength for controlled descent (6/8/22)  3. Patient will ambulate independently for 30 mins for ADLs  Status at last assessment: patient ambulates with NOEMI crutches and TTWB on RLE, RLE demonstrates inc knee flexion throughout gait cycle, dec WB in stance phase   Current: walk at beach independently with minimal discomfort and no crutches in several weeks (6/8/22)  4. Patient will report pain 3/10 at worst for progression to PLOF   Status at last assessment: 0-8/10  Current: 2-3 but more stiffness 6/3/22    PLAN  [x]  Upgrade activities as tolerated     [x]  Continue plan of care  []  Update interventions per flow sheet       []  Discharge due to:_  []  Other:_    Gia Bueno PTA 6/27/2022     Future Appointments   Date Time Provider Joanne Xiao   6/30/2022  5:45 PM Joey Majano PT [facility]   7/5/2022  7:45 AM Gali Antonio PTA [facility]   7/8/2022  7:15 AM [facility] [place] [facility]   7/11/2022  7:00 AM Daysi Davey, PT [facility]   7/12/2022  7:45 AM Gali Antonio PTA [facility]

## 2022-06-28 NOTE — PROGRESS NOTES
[facility] AT [facility]   [address] [place] [address] [address] [place] [address]  Phone: [phone] Fax: [phone]  PROGRESS NOTE  Patient Name: Nikki Goodell : 1985   Treatment/Medical Diagnosis: Pain in right knee [M25.561]   Referral Source: Henrik Gaspar MD     Date of Initial Visit: 22 Attended Visits: 27 Missed Visits: 1     SUMMARY OF TREATMENT  Pt is a 39y.o. year old female who presents with co R medial knee pain S/P knee arthroscopy DOS 22. Treatment has consisted of: therapeutic exercise to improve LE/lumbar strength/mobility; therapeutic activities to improve transfer/lift/carry ability; neuromuscular re-education to improve balance, core stability, neuromuscular control with lifting; physical agent/modality for symptom management; manual therapy for symptom relief and muscle flexibility; patient education to improve symptom management; self Care training; home safety training; stair training, and functional mobility training. CURRENT STATUS  Patient has attended PT for 27 sessions for the treatment of right knee pain, mostly recently completing 14 sessions since right knee arthroscopy with medial plica excision. Patient continues to demonstrate variable progress at this time. Knee mobility and overall LE strength is intact and WNL, although patient continues to report knee effusion/pain affecting ease with deep squatting, TKE during gait, and stair ascent.  Additional assessment includes:  Improvements: perceived strength, general mobility  Deficits: pain with last 10-15 degrees of TKE limiting ease with ascending stairs, deep squatting, sitting crisscross applesauce  FOTO score: 64/100  (R) knee AROM: 0-155 deg, with medial joint line pain at end-range flexion  (R) knee strength, via circumferential girth measurement, 2.5 inch superior to base of patella: 38.4 cm (for reference, left 40 cm)  (R) hip strength: flex 5/5, abd 5/5, ext 5/5  Core strength: 90% bridge  Hip AROM: ER (R) 35 deg, (L) 34 deg; IR (B) 34 deg  Pain: (B) 0, (W) 3-4    Goal/Measure of Progress   1.  Patient will increase FOTO score to 80/100 for indications of increased functional mobility. Status at last assessment: NT due to website malfunction  Current: goal progressing; 64/100  2.  Patient will demo ability to complete forward step down from 6\" step without increased valgus collapse or pain to improve ease with stair negotiation. Status at last assessment: nonreciprocal RLE compensatory pattern with NOEMI crutches  Current: goal progressing; pt notes ability to perform reciprocally but with reduced confidence and decreased eccentric quadriceps strength  3. Patient will ambulate independently for 30 mins for ADLs  Status at last assessment: patient ambulates with NOEMI crutches and TTWB on RLE, RLE demonstrates inc knee flexion throughout gait cycle, dec WB in stance phase   Current: goal met; walk [place] beach independently with minimal discomfort and no crutches in several weeks   4. Patient will report pain 3/10 at worst for progression to PLOF   Status at last assessment: 0-8/10  Current: goal near met; 3-4/10 worst pain    New Goals to be achieved in __6-8__  treatments:  Continue per established goals, with the addition of the following:  3. Patient will demonstrate right quadriceps circumferential girth measurement >/= 39.5 cm in promotion of symmetrical quadriceps strength to improve confidence with stair negotiation. RECOMMENDATIONS  Recommend cont skilled PT at 2-3x/weekly for 3 weeks to address strength/mobility deficits, achieve stated goals, and progress patient towards PLOF. If you have any questions/comments please contact us directly at [phone]. Thank you for allowing us to assist in the care of your patient.   LPTA Signature: Dc Guajardo Board  Date: 6/28/2022   PT Signature: Zunilda Cutler Time: 2:52 PM   NOTE TO PHYSICIAN:  PLEASE COMPLETE THE ORDERS BELOW AND FAX TO   [facility]: [phone]. If you are unable to process this request in 24 hours please contact our office: [phone].  ___ I have read the above report and request that my patient continue as recommended.   ___ I have read the above report and request that my patient continue therapy with the following changes/special instructions:_________________________________________________________   ___ I have read the above report and request that my patient be discharged from therapy.      Physician Signature:        Date:       Time:

## 2022-06-30 ENCOUNTER — HOSPITAL ENCOUNTER (OUTPATIENT)
Dept: PHYSICAL THERAPY | Age: 37
Discharge: HOME | End: 2022-06-30
Payer: OTHER GOVERNMENT

## 2022-06-30 PROCEDURE — 97112 NEUROMUSCULAR REEDUCATION: CPT

## 2022-06-30 PROCEDURE — 97110 THERAPEUTIC EXERCISES: CPT

## 2022-06-30 PROCEDURE — 97140 MANUAL THERAPY 1/> REGIONS: CPT

## 2022-06-30 NOTE — PROGRESS NOTES
PT DAILY TREATMENT NOTE     Patient Name: Emeli Malik  Date:2022  : 1985  [x]  Patient  Verified  Payor:  / Plan: Servando Roth 74 / Product Type:  /    In time: 5:44       Out time: 6:23  Total Treatment Time (min): 39  Visit #: 1 of     Treatment Area: Pain in right knee [M25.561]    SUBJECTIVE  Pain Level (0-10 scale): 1  Any medication changes, allergies to medications, adverse drug reactions, diagnosis change, or new procedure performed?: [x] No    [] Yes (see summary sheet for update)  Subjective functional status/changes:   [] No changes reported  \"I'm progressing! I could hop on 2 feet today and did great. I don't know if I have the strength in there yet. Stairs - no issues. Can perform normally. I took 2 weeks off (when I was off PT) and took some time off doing anything that was provoking of my symptoms. I feel stronger going down and i'm not collapsing into flexion. Doing home workouts and building it back up. \"     Deficits: sport, athletics      OBJECTIVE    Modality rationale: decrease inflammation and decrease pain to improve the patients ability to perform LE functional mobility, transfers, and ambulation with greater ease   Min Type Additional Details   NT [] Estim: []Att    []TENS instruct  []NMES                    []Other: with quad set   8 seconds on/ 5 seconds off []w/US   []w/ice   []w/heat  Position:   Location:   [] Skin assessment post-treatment:  []intact []redness- no adverse reaction    []redness - adverse reaction:       21 min Therapeutic Exercise:  [x] See flow sheet:     3 way hip modified to steamboats, standing on the airex (stance leg); 30x each direction  -Step downs performed on Reebok step with 1 riser: 5x each of a front step down, lateral, retro and curtsy  -Prisoner squats: from squat position --> 1/2 kneeling --> tall kneeling --> return to 1/2 kneeling and squat; Repeat with the other leg leading    Rationale: increase ROM and increase strength to improve the patients ability to perform gait and transfers with improved LE strength and mobility. 8 minutes: manual therapy; IASTM to distal quad in long-sitting  Rationale: decreased pain, edema management      10 min Neuro:  [x] See flow sheet:     Box jumps: step with 1 riser    2 to 2 jumping up and down; 10 reps  Lateral jumps 30\":  Notes pinching in the distal quad, peripatellar area medially and laterally/superior      Rationale: increase ROM and increase strength to improve the patients ability to perform gait and transfers with improved LE strength and mobility. With   [x] TE   [] TA   [] neuro   [] other: Patient Education: [x] Review HEP    [] Progressed/Changed HEP based on:   [] positioning   [] body mechanics   [] transfers   [] heat/ice application    [x] other:      Shoewear recommendations (x2) to be seen at [employer]   -modified quad stretch to avoid c/o knee pain with standing knee flexion/quad stretch ; stretch in L SL   -Jumping: in place; squat jumps; wall jumps. No lateral or front to back jumping as this is too much translation and yields c/o knee pain  -HEP every other day as pt is resuming more of her PLOF and higher activity level  -pain management  -no jogging until she can perform more plyometric program without c/o pain or instability          Other Objective/Functional Measures:     Pain with kneeling (pressure on knee) during prisoner squats. Pain Level (0-10 scale) post treatment: 0-1 (patellar tendon)    ASSESSMENT/Changes in Function: Pt demo's asymmetrical push off with plyometrics on the R LE. Notes no discomfort with landing, and demo's equal loading for landing in jumps. Lateral jumps yield c/o suprapatellar pain - advised to not perform at home. R knee AROM is WNL. Still has edema but does not appear t be limiting her function at this time.       Patient will continue to benefit from skilled PT services to modify and progress therapeutic interventions, address functional mobility deficits, address ROM deficits, address strength deficits, analyze and address soft tissue restrictions, analyze and cue movement patterns, analyze and modify body mechanics/ergonomics, assess and modify postural abnormalities and address imbalance/dizziness to attain remaining goals. []  See Plan of Care  [x]  See progress note/recertification  []  See Discharge Summary         Progress towards goals / Updated goals:   1.  Patient will increase FOTO score to 80/100 for indications of increased functional mobility. Status at last assessment:  64/100  2.  Patient will demo ability to complete forward step down from 6\" step without increased valgus collapse or pain to improve ease with stair negotiation. Status at last assessment: pt notes ability to perform reciprocally but with reduced confidence and decreased eccentric quadriceps strength  Current: reeobok steps downs performed front, lateral, back and curtsy with no pain and good mechanics (6/30/22)  3. Patient will ambulate independently for 30 mins for ADLs  Status at last assessment:walk at beach independently with minimal discomfort and no crutches in several weeks   Current: resumed walking in the neighborhood with no c/o pain (6/30/22)  4. Patient will report pain 3/10 at worst for progression to PLOF   Status at last assessment:  3-4/10 worst pain  Current: progressing well with decreasing pain overall (6/30/22)  5. Patient will demonstrate right quadriceps circumferential girth measurement >/= 39.5 cm in promotion of symmetrical quadriceps strength to improve confidence with stair negotiation.   Current: added bear crawls to address quad function (6/30/22)     PN due 7/27    PLAN  [x]  Upgrade activities as tolerated     [x]  Continue plan of care  []  Update interventions per flow sheet       []  Discharge due to:_  [x]  Other:_progress plyometrics to tolerance; consider addition of [place]ym[place] Squats to load quad for hypertrophy     Fredrick Cee, SHYANNE 6/30/2022     Future Appointments   Date Time Provider Joanne Xiao   6/30/2022  5:45 PM Rosa Felix, SHYANNE [facility]   7/5/2022  7:45 AM Delma Rich PTA [facility]   7/8/2022  7:15 AM [facility] [place] [facility]   7/11/2022  7:00 AM Valeria Messer, PT [facility]   7/12/2022  7:45 AM Delma Rich PTA [facility]

## 2022-07-05 ENCOUNTER — HOSPITAL ENCOUNTER (OUTPATIENT)
Dept: PHYSICAL THERAPY | Age: 37
Discharge: HOME | End: 2022-07-05
Payer: OTHER GOVERNMENT

## 2022-07-05 PROCEDURE — 97140 MANUAL THERAPY 1/> REGIONS: CPT

## 2022-07-05 PROCEDURE — 97112 NEUROMUSCULAR REEDUCATION: CPT

## 2022-07-05 PROCEDURE — 97110 THERAPEUTIC EXERCISES: CPT

## 2022-07-05 NOTE — PROGRESS NOTES
PT DAILY TREATMENT NOTE     Patient Name: Destinee Wu  Date:2022  : 1985  [x]  Patient  Verified  Payor: VERNELL / Plan: Servando Roth 74 / Product Type:  /    In time: 7:46 am           Out time: 8:36 am  Total Treatment Time (min): 50  Visit #: 2 of 6-9    Treatment Area: Pain in right knee [M25.561]    SUBJECTIVE  Pain Level (0-10 scale): 1  Any medication changes, allergies to medications, adverse drug reactions, diagnosis change, or new procedure performed?: [x] No    [] Yes (see summary sheet for update)  Subjective functional status/changes:   [] No changes reported  Patient reports mild soreness after last session and was able to swim a little over the weekend. Patient notes most pain when descending stairs with intermittent c/o popping in the knee that is somewhat relieving. OBJECTIVE  Modality rationale: decrease inflammation and decrease pain to improve the patients ability to perform LE functional mobility, transfers, and ambulation with greater ease   Min Type Additional Details   NT [] Estim: []Att    []TENS instruct  []NMES                    []Other:  []w/US   []w/ice   []w/heat  Position:   Location:   [] Skin assessment post-treatment:  []intact []redness- no adverse reaction    []redness - adverse reaction:       23 min Therapeutic Exercise:  [x] See flow sheet:    Rationale: increase ROM and increase strength to improve the patients ability to perform gait and transfers with improved LE strength and mobility.      9 min Manual Therapy:  (R) patellar mobs grade III with emphasis on superior; tibial IR for flexion with PROM; TCJ and midfoot mobs   Rationale: decrease edema  to improve the patients ability to perform ADLs. The manual therapy interventions were performed at a separate and distinct time from the therapeutic activities interventions.     18 min Neuromuscular Reeducation:  [x] See flow sheet: added SL runner march (SLS to march with emphasis on glut setting to mimic running; RDLs   Rationale: increase ROM and increase strength to improve the patients ability to perform gait and transfers with improved LE strength and mobility. With   [x] TE   [] TA   [] neuro   [] other: Patient Education: [x] Review HEP - LAQ with emphasis on eccentric lowering to build VMO tone and/or TKE in standing          Other Objective/Functional Measures: Added airex to avoid patellar pain with kneeling during prisoner squats  Added [place] squats    Plyometrics: double leg hops (forward/backward and laterally); split stance alternating hops    Pain Level (0-10 scale) post treatment: 0    ASSESSMENT/Changes in Function:   Patient demos improved symmetrical landing with DL plyometrics. Noted continued discomfort with mobility at last 10-15 degrees of extension, likely due to reduced VMO tone. Patient will continue to benefit from skilled PT services to modify and progress therapeutic interventions, address functional mobility deficits, address ROM deficits, address strength deficits, analyze and address soft tissue restrictions, analyze and cue movement patterns, analyze and modify body mechanics/ergonomics, assess and modify postural abnormalities and address imbalance/dizziness to attain remaining goals. []  See Plan of Care  [x]  See progress note/recertification  []  See Discharge Summary         Progress towards goals / Updated goals:   1.  Patient will increase FOTO score to 80/100 for indications of increased functional mobility. Status at last assessment:  64/100  2.  Patient will demo ability to complete forward step down from 6\" step without increased valgus collapse or pain to improve ease with stair negotiation.   Status at last assessment: pt notes ability to perform reciprocally but with reduced confidence and decreased eccentric quadriceps strength  Current: reeobok steps downs performed front, lateral, back and curtsy with no pain and good mechanics (6/30/22)  3. Patient will ambulate independently for 30 mins for ADLs  Status at last assessment:walk at beach independently with minimal discomfort and no crutches in several weeks    Current: resumed walking in the neighborhood with no c/o pain (6/30/22)  4. Patient will report pain 3/10 at worst for progression to PLOF   Status at last assessment:  3-4/10 worst pain  Current: progressing well with decreasing pain overall (6/30/22)  5. Patient will demonstrate right quadriceps circumferential girth measurement >/= 39.5 cm in promotion of symmetrical quadriceps strength to improve confidence with stair negotiation.   Current: added bear crawls to address quad function (6/30/22)  (PN due 7/27/22)    PLAN  [x]  Upgrade activities as tolerated     [x]  Continue plan of care  []  Update interventions per flow sheet       []  Discharge due to:_  []  Other:_    Shahram Gipson PTA 7/5/2022     Future Appointments   Date Time Provider Joanne Xiao   7/8/2022  7:15 AM [address] [place] [address] [facility]   7/11/2022  7:00 AM Christina Perkins, PT [facility]   7/12/2022  7:45 AM Monalisa Morales PTA [facility]

## 2022-07-08 ENCOUNTER — HOSPITAL ENCOUNTER (OUTPATIENT)
Dept: PHYSICAL THERAPY | Age: 37
Discharge: HOME | End: 2022-07-08
Payer: OTHER GOVERNMENT

## 2022-07-08 PROCEDURE — 97110 THERAPEUTIC EXERCISES: CPT

## 2022-07-08 PROCEDURE — 97016 VASOPNEUMATIC DEVICE THERAPY: CPT

## 2022-07-08 PROCEDURE — 97112 NEUROMUSCULAR REEDUCATION: CPT

## 2022-07-08 PROCEDURE — 97140 MANUAL THERAPY 1/> REGIONS: CPT

## 2022-07-08 NOTE — PROGRESS NOTES
PT DAILY TREATMENT NOTE     Patient Name: Guillermo Chung  Date:2022  : 1985  [x]  Patient  Verified  Payor: VERNELL / Plan: Servando Roth 74 / Product Type: Esther Mcgill /    In SPPN:[id number] am Out time:0812 am  Total Treatment Time (min): 55  Visit #: 3 of     Medicare/BCBS Only   Total Timed Codes (min):  n/a 1:1 Treatment Time:  n/a       Treatment Area: Pain in right knee [M25.561]    SUBJECTIVE  Pain Level (0-10 scale): 2/10  Any medication changes, allergies to medications, adverse drug reactions, diagnosis change, or new procedure performed?: [x] No    [] Yes (see summary sheet for update)  Subjective functional status/changes:   [] No changes reported  \"It's getting worse\"  Pt reports increased soreness, pain/difficulty going down the stairs     OBJECTIVE    Modality rationale: decrease edema, decrease inflammation and decrease pain to improve the patients ability to perform LE functional mobility, transfers, and ambulation with greater ease   Min Type Additional Details    [] Estim:  []Unatt       []IFC  []Premod                        []Other:  []w/ice   []w/heat  Position:   Location:     [] Estim: []Att    []TENS instruct  []NMES                    []Other:  []w/US   []w/ice   []w/heat  Position:  Location:    []  Traction: [] Cervical       []Lumbar                       [] Prone          []Supine                       []Intermittent   []Continuous Lbs:  [] before manual  [] after manual    []  Ultrasound: []Continuous   [] Pulsed                           []1MHz   []3MHz W/cm2:  Location:    []  Iontophoresis with dexamethasone         Location: [] Take home patch   [] In clinic    []  Ice     []  heat  []  Ice massage  []  Laser   []  Anodyne Position:   Location:     []  Laser with stim  []  Other:  Position:  Location:   8 + 4' set up/removal [x]  Vasopneumatic Device  - right knee with LE's on wedge  [x]  Right     []  Left  Pre-treatment girth: 37 cm  Post-treatment girth: 36.7 cm  Measured at (location): suprapatellar Pressure:       [] lo [x] med [] hi   Temperature: [x] lo [] med [] hi   [x] Skin assessment post-treatment:  [x]intact []redness- no adverse reaction    []redness - adverse reaction:       20 min Therapeutic Exercise:  [x] See flow sheet : bike- assess, adduction stretch, squats, hip 3 ways    Rationale: increase ROM and increase strength to improve the patients ability to perform gait and transfers with improved LE strength and mobility. 14 min Neuromuscular Re-education:  [x]  See flow sheet : Bear crawls, [place] board, RDLs   Rationale: increase ROM and increase strength to improve the patients ability to perform gait and transfers with improved LE strength and mobility. 9 min Manual Therapy:  Retrograde massage to Right knee for decreased swelling    The manual therapy interventions were performed at a separate and distinct time from the therapeutic activities interventions. Rationale: decrease pain, increase ROM, increase tissue extensibility and decrease trigger points to improve ease of ADLs and functional activities. With   [] TE   [] TA   [] neuro   [] other: Patient Education: [x] Review HEP    [] Progressed/Changed HEP based on:   [] positioning   [] body mechanics   [] transfers   [] heat/ice application    [x] other: Swelling in the last week likely due to new interventions - discussed with patient continued monitoring of swelling/soreness. Other Objective/Functional Measures:   Circumferential measurements:   Mid patella: left: 36.5 right: 37  Suprapatellar: left: 37.5 right: 38  Post manual: Right suprapatellar: 37 cm    Quad belly: Right: 43 cm Left: 45 cm     Progressed repetitions for single leg RDLs    Pain Level (0-10 scale) post treatment: 2    ASSESSMENT/Changes in Function: Patient was able to perform bear walks today forward and back with no increased pain reported at right knee.  Held off on plyometrics today due to increased right knee swelling/reported symptoms pre-PT session. Noted swelling in right knee compared to left that decreased post manual therapy techniques and Vaso modality - see measurements above. Will plan to continue to progress patient with plyometrics and LE strengthening and stability continuing to monitor swelling and discomfort in right knee. Patient will continue to benefit from skilled PT services to modify and progress therapeutic interventions, address functional mobility deficits, address ROM deficits, address strength deficits, analyze and address soft tissue restrictions, analyze and cue movement patterns, analyze and modify body mechanics/ergonomics, assess and modify postural abnormalities and address imbalance/dizziness to attain remaining goals. []  See Plan of Care  []  See progress note/recertification  []  See Discharge Summary         Progress towards goals / Updated goals:   1.  Patient will increase FOTO score to 80/100 for indications of increased functional mobility. Status at last assessment:  64/100  Current: will plan to assess near MD note (7/8/22)  2.  Patient will demo ability to complete forward step down from 6\" step without increased valgus collapse or pain to improve ease with stair negotiation.   Status at last assessment: pt notes ability to perform reciprocally but with reduced confidence and decreased eccentric quadriceps strength  Current: reeobok steps downs performed front, lateral, back and curtsy with no pain and good mechanics (6/30/22) pt reports continued pain in right knee with descending stairs (7/8/22)  3. Patient will ambulate independently for 30 mins for ADLs  Status at last assessment:walk at beach independently with minimal discomfort and no crutches in several weeks    Current: resumed walking in the neighborhood with no c/o pain (6/30/22)  4. Patient will report pain 3/10 at worst for progression to PLOF   Status at last assessment:  3-4/10 worst pain  Current: progressing well with decreasing pain overall (6/30/22)  5. Patient will demonstrate right quadriceps circumferential girth measurement >/= 39.5 cm in promotion of symmetrical quadriceps strength to improve confidence with stair negotiation.   Current: added bear crawls to address quad function (6/30/22)   7/8/22:   Circumferential measurements:   Mid patella: left: 36.5 right: 37  Suprapatellar: left: 37.5 right: 38  Post manual: Right suprapatellar: 37 cm  Quad belly: Right: 43 cm Left: 45 cm  (PN due 7/27/22)    PLAN  [x]  Upgrade activities as tolerated     [x]  Continue plan of care  []  Update interventions per flow sheet       []  Discharge due to:_  []  Other:_      Carol Zurita, SHYANNE 7/8/2022  4:53 PM     Future Appointments   Date Time Provider Joanne Xiao   7/11/2022  7:00 AM Urvashi Nelson, PT [facility]   7/12/2022  7:45 AM Willa Negron PTA [facility]

## 2022-07-11 ENCOUNTER — HOSPITAL ENCOUNTER (OUTPATIENT)
Dept: PHYSICAL THERAPY | Age: 37
Discharge: HOME | End: 2022-07-11
Payer: OTHER GOVERNMENT

## 2022-07-11 PROCEDURE — 97016 VASOPNEUMATIC DEVICE THERAPY: CPT

## 2022-07-11 PROCEDURE — 97110 THERAPEUTIC EXERCISES: CPT

## 2022-07-11 PROCEDURE — 97140 MANUAL THERAPY 1/> REGIONS: CPT

## 2022-07-11 NOTE — PROGRESS NOTES
PT DAILY TREATMENT NOTE     Patient Name: Verónica Beckman  Date:2022  : 1985  [x]  Patient  Verified  Payor: VERNELL / Plan: Servando Roth 74 / Product Type:  /    In time: 705 Out time:755  Total Treatment Time (min): 50  Visit #: 4 of 6-9      Treatment Area: Pain in right knee [M25.561]    SUBJECTIVE  Pain Level (0-10 scale): 1-2/10  Any medication changes, allergies to medications, adverse drug reactions, diagnosis change, or new procedure performed?: [x] No    [] Yes (see summary sheet for update)  Subjective functional status/changes:   [] No changes reported  Patient reports \"the normal area of pain in the joint line. \"  Deficits: tightness and strain - ni with stair     OBJECTIVE    Modality rationale: decrease edema, decrease inflammation and decrease pain to improve the patients ability to perform LE functional mobility, transfers, and ambulation with greater ease   Min Type Additional Details    [] Estim:  []Unatt       []IFC  []Premod                        []Other:  []w/ice   []w/heat  Position:   Location:     [] Estim: []Att    []TENS instruct  []NMES                    []Other:  []w/US   []w/ice   []w/heat  Position:  Location:    []  Traction: [] Cervical       []Lumbar                       [] Prone          []Supine                       []Intermittent   []Continuous Lbs:  [] before manual  [] after manual    []  Ultrasound: []Continuous   [] Pulsed                           []1MHz   []3MHz W/cm2:  Location:    []  Iontophoresis with dexamethasone         Location: [] Take home patch   [] In clinic    []  Ice     []  heat  []  Ice massage  []  Laser   []  Anodyne Position:   Location:     []  Laser with stim  []  Other:  Position:  Location:   10 [x]  Vasopneumatic Device  - right knee with LE's on wedge  [x]  Right     []  Left  Pre-treatment girth: 37 cm   Post-treatment girth: 35.8  Measured at (location): suprapatellar Pressure:       [] lo [x] med [] hi   Temperature: [x] lo [] med [] hi   [x] Skin assessment post-treatment:  [x]intact []redness- no adverse reaction    []redness - adverse reaction:       15 min Therapeutic Exercise:  [x] See flow sheet : bike- assess   Rationale: increase ROM and increase strength to improve the patients ability to perform gait and transfers with improved LE strength and mobility. - min Neuromuscular Re-education:  [x]  See flow sheet :   Rationale: increase ROM and increase strength to improve the patients ability to perform gait and transfers with improved LE strength and mobility. 25 min Manual Therapy:   Alignment assessment   medial patellar ligament CFM/ DTM  Grade 4 patellar joint mobs - ni lateral   Ankle joint mobs for DF/PF/ IV and EV    The manual therapy interventions were performed at a separate and distinct time from the therapeutic activities interventions. Rationale: decrease pain, increase ROM, increase tissue extensibility and decrease trigger points to improve ease of ADLs and functional activities. With rx Patient Education: [x] Review HEP  , self mobs of patella, HR x 3 and ankle PF/Knee flexion kneeling stretch      Other Objective/Functional Measures:   Deficits: running , stairs      Palpation/ inspection/ observation: pelvic alignment WNL     R knee squinting patella    R patellar hypomobility - ni laterally     Pain Level (0-10 scale) post treatment: 1-2    ASSESSMENT/Changes in Function: Patient reports \"the same trouble spots\" - tightness in patellar tendon and medial patella. Things were progressing well until onset of plyometrics - especially lateral movements. Noted significant R squinting patella. Attempted to improve alignment with MT with min changes. Noted R ankle PF restrictions compared to L - addressed with kneeling PF stretch - patient felt most stretch in PFL, but able to tolerate.   Treatment focused on alignment assessment and manual interventions and patient education. Added  One more apt scheduled on 7/20 sec to vacation - then MD apt on 7/21. Rec cont 1x per week to monitoring . Patient will continue to benefit from skilled PT services to modify and progress therapeutic interventions, address functional mobility deficits, address ROM deficits, address strength deficits, analyze and address soft tissue restrictions, analyze and cue movement patterns, analyze and modify body mechanics/ergonomics, assess and modify postural abnormalities and address imbalance/dizziness to attain remaining goals. []  See Plan of Care  [x]  See progress note/recertification 5/31  []  See Discharge Summary         Progress towards goals / Updated goals:   1.  Patient will increase FOTO score to 80/100 for indications of increased functional mobility. Status at last assessment:  64/100  Current: will plan to assess near MD note (7/8/22)    2.  Patient will demo ability to complete forward step down from 6\" step without increased valgus collapse or pain to improve ease with stair negotiation. Status at last assessment: pt notes ability to perform reciprocally but with reduced confidence and decreased eccentric quadriceps strength  Current: reeobok steps downs performed front, lateral, back and curtsy with no pain and good mechanics (6/30/22) pt reports continued pain in right knee with descending stairs (7/8/22)    3. Patient will ambulate independently for 30 mins for ADLs  Status at last assessment:walk [place] beach independently with minimal discomfort and no crutches in several weeks    Current: resumed walking in the neighborhood with no c/o pain (6/30/22)    4. Patient will report pain 3/10 at worst for progression to PLOF   Status at last assessment:  3-4/10 worst pain  Current: progressing well with decreasing pain overall (6/30/22)    5.  Patient will demonstrate right quadriceps circumferential girth measurement >/= 39.5 cm in promotion of symmetrical quadriceps strength to improve confidence with stair negotiation.   Current: added bear crawls to address quad function (6/30/22)   7/8/22:   Circumferential measurements:   Mid patella: left: 36.5 right: 37  Suprapatellar: left: 37.5 right: 38  Post manual: Right suprapatellar: 37 cm  Quad belly: Right: 43 cm Left: 45 cm  (PN due 7/27/22)    PLAN  [x]  Upgrade activities as tolerated     [x]  Continue plan of care  []  Update interventions per flow sheet       []  Discharge due to:_   [x]  Other:_ Consider 1x per week after returning form vacation  MD crowley on 7/21- reassess GO Godfrey, PT 7/11/2022  4:53 PM     Future Appointments   Date Time Provider Joanne Xiao   7/11/2022  7:00 AM Carol Mcadams, PT [facility]   7/20/2022  4:15 PM Iraida Rodriguez PTA [facility]

## 2022-07-12 ENCOUNTER — APPOINTMENT (OUTPATIENT)
Dept: PHYSICAL THERAPY | Age: 37
End: 2022-07-12
Payer: OTHER GOVERNMENT

## 2022-07-20 ENCOUNTER — HOSPITAL ENCOUNTER (OUTPATIENT)
Dept: PHYSICAL THERAPY | Age: 37
Discharge: HOME | End: 2022-07-20
Payer: OTHER GOVERNMENT

## 2022-07-20 PROCEDURE — 97110 THERAPEUTIC EXERCISES: CPT

## 2022-07-20 PROCEDURE — 97112 NEUROMUSCULAR REEDUCATION: CPT

## 2022-07-20 PROCEDURE — 97140 MANUAL THERAPY 1/> REGIONS: CPT

## 2022-07-20 PROCEDURE — 97035 APP MDLTY 1+ULTRASOUND EA 15: CPT

## 2022-07-20 NOTE — PROGRESS NOTES
PT DAILY TREATMENT NOTE     Patient Name: Torrie Perez  Date:2022  : 1985  [x]  Patient  Verified  Payor: VERNELL / Plan: Servando Roth 74 / Product Type:  /    In time: 4:25 pm        Out time: 5:18 pm  Total Treatment Time (min): 48  Visit #: 5 of 6-9      Treatment Area: Pain in right knee [M25.561]    SUBJECTIVE  Pain Level (0-10 scale): 0  Any medication changes, allergies to medications, adverse drug reactions, diagnosis change, or new procedure performed?: [x] No    [] Yes (see summary sheet for update)  Subjective functional status/changes:   [] No changes reported  Patient notes continued discomfort in the same place, primarily when extending the knee fully as when stepping through her stride to walk forward. Patient denies buckling/locking of the knee and inner/side knee pain.     OBJECTIVE    Modality rationale: decrease inflammation and decrease pain to improve the patients ability to perform LE functional mobility, transfers, and ambulation with greater ease   Min Type Additional Details    [] Estim:  []Unatt       []IFC  []Premod                        []Other:  []w/ice   []w/heat  Position:   Location:     [] Estim: []Att    []TENS instruct  []NMES                    []Other:  []w/US   []w/ice   []w/heat  Position:  Location:    []  Traction: [] Cervical       []Lumbar                       [] Prone          []Supine                       []Intermittent   []Continuous Lbs:  [] before manual  [] after manual   8 [x]  Ultrasound: [x]Continuous (changed to pulsed after 3 minutes)  [x] Pulsed 50%                           []1MHz   [x]3MHz W/cm2: 1.0  Location: (R) medial knee    []  Iontophoresis with dexamethasone         Location: [] Take home patch   [] In clinic    []  Ice     []  heat  []  Ice massage  []  Laser   []  Anodyne Position:   Location:     []  Laser with stim  []  Other:  Position:  Location:   NT []  Vasopneumatic Device  - right knee with LE's on wedge  []  Right     []  Left  Pre-treatment girth:   Post-treatment girth:   Measured at (location):  Pressure:       [] lo [] med [] hi   Temperature: [] lo [] med [] hi   [x] Skin assessment post-treatment:  [x]intact []redness- no adverse reaction    []redness - adverse reaction:       17 min Therapeutic Exercise:  [x] See flow sheet:   Rationale: increase ROM and increase strength to improve the patients ability to perform gait and transfers with improved LE strength and mobility. 10 min Neuromuscular Re-education:  [x]  See flow sheet: reinitiate bear crawls, RDLs   Rationale: increase ROM and increase strength to improve the patients ability to perform gait and transfers with improved LE strength and mobility. 18 min Manual Therapy: SI check - apparent (L) LE shorter; prone grade IV mobs to the T/S and L/S with cavitation present; recheck - symmetrical; CFM to (R) medial extensor retinacula and TPR to lateral genu articularis, patellar mobs in all directions with emphasis on superior and lateral   The manual therapy interventions were performed at a separate and distinct time from the therapeutic activities interventions. Rationale: decrease pain, increase ROM, increase tissue extensibility and decrease trigger points to improve ease of ADLs and functional activities. With rx Patient Education: [x] Review HEP; cont'd self-mobs of the patella, best performed after CFM to the medial extensor retinaculum mechanism, scar massage     Other Objective/Functional Measures:     Pain Level (0-10 scale) post treatment: 0 at rest    ASSESSMENT/Changes in Function:   Patient with notable (R) patellar medial glide and tilt which responds well to manual interventions, therefore, educated patient on self-performance for home use.  Initiated US today to reduce localized inflammation and improve tissue extensibility with good tolerance and one instance of discomfort in the pes anserinus region, likely due to positioning on the wedge; improved with modification to supine. Patient to follow-up with surgeon tomorrow regarding alternative options. Noted progressive (R) genu valgus with RDLs. Patient will continue to benefit from skilled PT services to modify and progress therapeutic interventions, address functional mobility deficits, address ROM deficits, address strength deficits, analyze and address soft tissue restrictions, analyze and cue movement patterns, analyze and modify body mechanics/ergonomics, assess and modify postural abnormalities and address imbalance/dizziness to attain remaining goals. []  See Plan of Care  [x]  See progress note/recertification 6/64  []  See Discharge Summary         Progress towards goals / Updated goals:   1. Patient will increase FOTO score to 80/100 for indications of increased functional mobility. Status at last assessment:  64/100  Current: will plan to assess near MD note (7/8/22)    2. Patient will demo ability to complete forward step down from 6\" step without increased valgus collapse or pain to improve ease with stair negotiation. Status at last assessment: pt notes ability to perform reciprocally but with reduced confidence and decreased eccentric quadriceps strength  Current: reeobok steps downs performed front, lateral, back and curtsy with no pain and good mechanics (6/30/22) pt reports continued pain in right knee with descending stairs (7/8/22)    3. Patient will ambulate independently for 30 mins for ADLs  Status at last assessment:walk at beach independently with minimal discomfort and no crutches in several weeks    Current: resumed walking in the neighborhood with no c/o pain (6/30/22)    4. Patient will report pain 3/10 at worst for progression to PLOF   Status at last assessment:  3-4/10 worst pain  Current: progressing well with decreasing pain overall (6/30/22)    5.  Patient will demonstrate right quadriceps circumferential girth measurement >/= 39.5 cm in promotion of symmetrical quadriceps strength to improve confidence with stair negotiation. Current: added bear crawls to address quad function (6/30/22)   7/8/22:   Circumferential measurements:   Mid patella: left: 36.5 right: 37  Suprapatellar: left: 37.5 right: 38  Post manual: Right suprapatellar: 37 cm  Quad belly: Right: 43 cm Left: 45 cm  (PN due 7/27/22)    PLAN  [x]  Upgrade activities as tolerated     [x]  Continue plan of care  []  Update interventions per flow sheet       []  Discharge due to:_   [x]  Other:_ Consider 1x per week after returning from vacation  MD crowley on 7/21- reassess  [facility], [facility] 7/20/2022  4:53 PM     No future appointments.

## 2022-08-10 ENCOUNTER — APPOINTMENT (OUTPATIENT)
Dept: PHYSICAL THERAPY | Age: 37
End: 2022-08-10
Payer: OTHER GOVERNMENT

## 2022-08-17 ENCOUNTER — APPOINTMENT (OUTPATIENT)
Dept: PHYSICAL THERAPY | Age: 37
End: 2022-08-17
Payer: OTHER GOVERNMENT

## 2022-08-17 ENCOUNTER — HOSPITAL ENCOUNTER (OUTPATIENT)
Dept: PHYSICAL THERAPY | Age: 37
Discharge: HOME | End: 2022-08-17
Payer: OTHER GOVERNMENT

## 2022-08-17 PROCEDURE — 97112 NEUROMUSCULAR REEDUCATION: CPT

## 2022-08-17 PROCEDURE — 97014 ELECTRIC STIMULATION THERAPY: CPT

## 2022-08-17 PROCEDURE — 97110 THERAPEUTIC EXERCISES: CPT

## 2022-08-17 NOTE — PROGRESS NOTES
[facility] AT [address] [place]  Phone: [phone] Fax: [phone]  PROGRESS NOTE  Patient Name: Sudarshan Ragsdale : 1985   Treatment/Medical Diagnosis: Pain in right knee [M25.561]   Referral Source: Tony Mansfield MD     Date of Initial Visit: 22 Attended Visits: 33 Missed Visits: 1     SUMMARY OF TREATMENT  Pt is a 39y.o. year old female who presents with co R medial knee pain S/P knee arthroscopy DOS 22. Treatment has consisted of: therapeutic exercise to improve LE/lumbar strength/mobility; therapeutic activities to improve transfer/lift/carry ability; neuromuscular re-education to improve balance, core stability, neuromuscular control with lifting; physical agent/modality for symptom management; manual therapy for symptom relief and muscle flexibility; patient education to improve symptom management; self Care training; home safety training; stair training, and functional mobility training. CURRENT STATUS  Patient has attended PT for 33 sessions with gap in treatment sec to vacation. Patient presents with continued medial knee pain, popping and patella locking with activities. Patient had MRI and is pending results with MD tomorrow. Patient is very compliant with HEP but is unable to perform exercises for proper quad strengthening sec to knee pain. Patient would benefit from NMES for quad training and BFR training.    Pain at best 0, at worst 3-4   Subjective % improvement 50%   Objective:   R knee AROM 0-144  R knee strength 5/5   Quad girth relaxer R 39.5, l 41.5   Contracted R 41, L 42.5   Knee edema 1 cm   R hip strength grossly 5/5 , 2 deg quad lag   Ankle assessment: subtalar neutral negative   LE/ Patellar positioning  : medial / IR   Patellar popping  SI alignment : negative long sit   Improvements: walking tolerance   Deficits squats, lunges - back and side , running , stair negotiation MRI IMPRESSION   No meniscal or ligament tear identified. Status post limited chondroplasty of small medial femoral condyle chondral lesion with typical postoperative appearance. Anterior synovectomy and plical excision. Progress towards goals / Updated goals:   1. Patient will increase FOTO score to 80/100 for indications of increased functional mobility. Status at last assessment:  64/100  Current: goal not met / will formally assess before DC sec to high pain levels     2. Patient will demo ability to complete forward step down from 6\" step without increased valgus collapse or pain to improve ease with stair negotiation. Status at last assessment: pt notes ability to perform reciprocally but with reduced confidence and decreased eccentric quadriceps strength  Current: no valgus collapse however 4+/10 pain     3. Patient will ambulate independently for 30 mins for ADLs  Status at last assessment:walk at beach independently with minimal discomfort and no crutches in several weeks    Current: met - patient reports IND amb with min pain     4. Patient will report pain 3/10 at worst for progression to PLOF   Status at last assessment:  3-4/10 worst pain  Current:goa not met at 8/10     5. Patient will demonstrate right quadriceps circumferential girth measurement >/= 39.5 cm in promotion of symmetrical quadriceps strength to improve confidence with stair negotiation. Current: goal met but not symmetrical to L LE        New Goals to be achieved in __6-8__  treatments:  Cont unmet goals as above       RECOMMENDATIONS  Recommend cont skilled PT at 1-2x/weekly for 4 weeks to address strength/mobility deficits, achieve stated goals, and progress patient towards PLOF. Recommend NMES UNIT and BFR training. If you have any questions/comments please contact us directly at [phone]. Thank you for allowing us to assist in the care of your patient.     Date: 8/17/2022   PT Signature: Adore Bishop. Conrad Morse, PT Time: 65a   NOTE TO PHYSICIAN:  PLEASE COMPLETE THE ORDERS BELOW AND FAX TO   [facility]: [phone]. If you are unable to process this request in 24 hours please contact our office: [phone].  ___ I have read the above report and request that my patient continue as recommended.   ___ I have read the above report and request that my patient continue therapy with the following changes/special instructions:_________________________________________________________   ___ I have read the above report and request that my patient be discharged from therapy.      Physician Signature:        Date:       Time:

## 2022-08-17 NOTE — PROGRESS NOTES
PT DAILY TREATMENT NOTE     Patient Name: Sourav Gutierrez  Date:2022  : 1985  [x]  Patient  Verified  Payor: VERNELL / Plan: Servando Roth 74 / Product Type:  /    In time: 745       Out time:829  Total Treatment Time (min): 44  Visit #: 5 of 6-9      Treatment Area: Pain in right knee [M25.561]    SUBJECTIVE  Pain Level (0-10 scale): 3-4  Any medication changes, allergies to medications, adverse drug reactions, diagnosis change, or new procedure performed?: [x] No    [] Yes (see summary sheet for update)  Subjective functional status/changes:   [] No changes reported  Patient reports she had an MRI and is seeing MD tomorrow for results.     OBJECTIVE    Modality rationale: decrease inflammation and decrease pain to improve the patients ability to perform LE functional mobility, transfers, and ambulation with greater ease   Min Type Additional Details   10 [x] Estim:  NMES 5 on 10 off with QS  []w/ice   []w/heat  Position: semi reclined   Location:  R quad     [] Estim: []Att    []TENS instruct  []NMES                    []Other:  []w/US   []w/ice   []w/heat  Position:  Location:    []  Traction: [] Cervical       []Lumbar                       [] Prone          []Supine                       []Intermittent   []Continuous Lbs:  [] before manual  [] after manual   NT [x]  Ultrasound: [x]Continuous (changed to pulsed after 3 minutes)  [x] Pulsed 50%                           []1MHz   [x]3MHz W/cm2: 1.0  Location: (R) medial knee    []  Iontophoresis with dexamethasone         Location: [] Take home patch   [] In clinic    []  Ice     []  heat  []  Ice massage  []  Laser   []  Anodyne Position:   Location:     []  Laser with stim  []  Other:  Position:  Location:    []  Vasopneumatic Device  - right knee with LE's on wedge  []  Right     []  Left  Pre-treatment girth:   Post-treatment girth:   Measured at (location):  Pressure:       [] lo [] med [] hi   Temperature: [] lo [] med [] hi   [x] Skin assessment post-treatment:  [x]intact []redness- no adverse reaction    []redness - adverse reaction:       26 min Therapeutic Exercise:  [x] See flow sheet: REASSESSMENT, reassess while on bike    Rationale: increase ROM and increase strength to improve the patients ability to perform gait and transfers with improved LE strength and mobility. 8 min Neuromuscular Re-education:  [x]  See flow sheet: KT tape for tracking/ NM facilitation    Rationale: increase ROM and increase strength to improve the patients ability to perform gait and transfers with improved LE strength and mobility. 0 min Manual Therapy: NT   The manual therapy interventions were performed at a separate and distinct time from the therapeutic activities interventions. Rationale: decrease pain, increase ROM, increase tissue extensibility and decrease trigger points to improve ease of ADLs and functional activities. With rx Patient Education: [x] Review HEP , cont POC, BFR, NMES, bracing      Other Objective/Functional Measures:   Goals assessed for PN   Pain at best 0, at worst 3-4   Subjective % improvement 50%   Objective:   R knee AROM 0-144  R knee strength 5/5   Quad girth relaxer R 39.5, l 41.5   Contracted R 41, L 42.5   Knee edema 1 cm   R hip strength grossly 5/5 , 2 deg quad lag   Ankle assessment: subtalar neutral negative   LE/ Patellar positioning  : medial / IR   Patellar popping  SI alignment : negative long sit   Improvements: walking tolerance   Deficits squats, lunges - back and side , running , stair negotiation   MRI IMPRESSION   No meniscal or ligament tear identified. Status post limited chondroplasty of small medial femoral condyle chondral lesion with typical postoperative appearance. Anterior synovectomy and plical excision.      Pain Level (0-10 scale) post treatment: 0 at rest    ASSESSMENT/Changes in Function:   SEE PN     Patient will continue to benefit from skilled PT services to modify and progress therapeutic interventions, address functional mobility deficits, address ROM deficits, address strength deficits, analyze and address soft tissue restrictions, analyze and cue movement patterns, analyze and modify body mechanics/ergonomics, assess and modify postural abnormalities and address imbalance/dizziness to attain remaining goals. []  See Plan of Care  [x]  See progress note/recertification 6/35  []  See Discharge Summary         Progress towards goals / Updated goals:   1. Patient will increase FOTO score to 80/100 for indications of increased functional mobility. Status at last assessment:  64/100  Current: goal not met / will formally assess before DC sec to high pain levels     2. Patient will demo ability to complete forward step down from 6\" step without increased valgus collapse or pain to improve ease with stair negotiation. Status at last assessment: pt notes ability to perform reciprocally but with reduced confidence and decreased eccentric quadriceps strength  Current: no valgus collapse however 4+/10 pain     3. Patient will ambulate independently for 30 mins for ADLs  Status at last assessment:walk [place] beach independently with minimal discomfort and no crutches in several weeks    Current: met - patient reports IND amb with min pain     4. Patient will report pain 3/10 at worst for progression to PLOF   Status at last assessment:  3-4/10 worst pain  Current:goa not met at 8/10     5. Patient will demonstrate right quadriceps circumferential girth measurement >/= 39.5 cm in promotion of symmetrical quadriceps strength to improve confidence with stair negotiation.   Current: goal met but not symmetrical to L LE      PLAN  [x]  Upgrade activities as tolerated     [x]  Continue plan of care  []  Update interventions per flow sheet       []  Discharge due to:_   [x]  Other:_ Transfer to CP for BFR    NMES unit     Lindsey Del Real, PT 8/17/2022      Future Appointments   Date Time Provider Joanne Xiao   8/17/2022  7:45 AM Vandana Dodd., PT [facility]

## 2022-09-01 ENCOUNTER — HOSPITAL ENCOUNTER (OUTPATIENT)
Dept: PHYSICAL THERAPY | Age: 37
Discharge: HOME | End: 2022-09-01
Payer: OTHER GOVERNMENT

## 2022-09-01 PROCEDURE — 97014 ELECTRIC STIMULATION THERAPY: CPT

## 2022-09-01 PROCEDURE — 97110 THERAPEUTIC EXERCISES: CPT

## 2022-09-01 NOTE — PROGRESS NOTES
PT DAILY TREATMENT NOTE     Patient Name: Gold Corrigan  Date:2022  : 1985  [x]  Patient  Verified  Payor: VERNELL / Plan: Servando Roth 74 / Product Type:  /    In time: 2:15 PM       Out time: 3:20 PM  Total Treatment Time (min): 65  Visit #: 34 of 39-41      Treatment Area: Pain in right knee [M25.561]    SUBJECTIVE  Pain Level (0-10 scale): 2  Any medication changes, allergies to medications, adverse drug reactions, diagnosis change, or new procedure performed?: [x] No    [] Yes (see summary sheet for update)  Subjective functional status/changes:   [] No changes reported  Pt had a cortisone shot a couple weeks ago and it was feeling better, but she increased activity and is unsure if this has caused an increase in her pain levels. Did some yoga, HEP. Pt c/o most pain to medial, anterior knee, \"catching\" to patellar tendon region, frequent crepitus, intolerance for wb'ing activities (I.e. squats) and loss of mm mass to VL region.       OBJECTIVE    Modality rationale: decrease inflammation and decrease pain to improve the patients ability to perform LE functional mobility, transfers, and ambulation with greater ease   Min Type Additional Details   8 [x] Estim:  NMES 10 on 20 off with QS @ 60 deg at EOB []w/ice   []w/heat  Position: semi reclined   Location:  R quad     [] Estim: []Att    []TENS instruct  []NMES                    []Other:  []w/US   []w/ice   []w/heat  Position:  Location:        NT [x]  Ultrasound: [x]Continuous (changed to pulsed after 3 minutes)  [x] Pulsed 50%                           []1MHz   [x]3MHz W/cm2: 1.0  Location: (R) medial knee    []  Iontophoresis with dexamethasone         Location: [] Take home patch   [] In clinic    []  Ice     []  heat   Position:   Location:     []  Laser with stim  []  Other:  Position:  Location:    []  Vasopneumatic Device  - right knee with LE's on wedge  []  Right     []  Left  Pre-treatment girth: Post-treatment girth:   Measured at (location):  Pressure:       [] lo [] med [] hi   Temperature: [] lo [] med [] hi   [x] Skin assessment post-treatment:  [x]intact []redness- no adverse reaction    []redness - adverse reaction:       Blood Flow Restriction Procedure Note    Blood Flow Restriction Session Number:  1    If applicable, were there any adverse reactions to the last blood flow restriction therapy session? n/a      Chart reviewed for the following:  Delfina TAVAREZ, PT, have reviewed the medical history, summary list and precautions/contraindications for Freddy. TIME OUT performed immediately prior to start of procedure:  2:43 PM  (enter time the timeout was completed)  Delfina TAVAREZ, PT, have performed the following reviews on Freddy prior to the start of the session:        [x] Patient was identified by name and date of birth    [x] Purpose of blood flow restriction therapy, side effects, possible complications, risks and benefits were explained to the patient   [x] Procedure site(s) verified  [x] Informed Consent was signed (initial visit) and verified verbally (subsequent visits)  [x] Patient was instructed on the need to report any new medical conditions, procedures or medications. [x] How to respond to possible adverse effects of treatment  [x] Opportunity was given to ask any questions, all questions were answered            Location(s) of blood flow restriction cuffs:   UE:   []  Right     []  Left             LE:    []  Right    [x]  Left           Pressure applied at the cuffs: 162 mm Hg (60% of total limb occlusion pressure; (270 mmHg) to create a hypoxic environment within the muscle that allows for anaerobic metabolism and recruitment of larger/fast twitch motor units.     Patients response to todays treatment:   []  General muscle soreness []  Numbness/tingling of extremity    []  Dizziness      []  Other:      [x] Skin assessment post-treatment:    [x]  intact    [x]  redness- no adverse reaction     []  redness - adverse reaction:             57 min Therapeutic Exercise:  [x] See flow sheet:    Rationale: increase ROM and increase strength to improve the patients ability to perform gait and transfers with improved LE strength and mobility. With rx Patient Education: [x] Review HEP , cont POC, BFR, NMES     Other Objective/Functional Measures:   -initiated treatment with warm up and band walks progressing to BFR training  -LOP pressure calculated and BFR training proceeded as per flow sheet finishing with NMES    Pain Level (0-10 scale) post treatment: 0    ASSESSMENT/Changes in Function:   Pt with well defined quad mm, however complaining of knee pain/crepitus and decreased strength to VL. PT notes distal VL with slight prominence in standing with slight indentation/possible myofascial restriction contributing to appearance; may benefit from FR to quad vs manual therapy. Patient will continue to benefit from skilled PT services to modify and progress therapeutic interventions, address functional mobility deficits, address ROM deficits, address strength deficits, analyze and address soft tissue restrictions, analyze and cue movement patterns, analyze and modify body mechanics/ergonomics, assess and modify postural abnormalities and address imbalance/dizziness to attain remaining goals. []  See Plan of Care  []  See progress note/recertification   []  See Discharge Summary         Progress towards goals / Updated goals:   Next PN Due  9/17  New Goals to be achieved in __6-8__  treatments:  1. Patient will increase FOTO score to 80/100 for indications of increased functional mobility. Status at last assessment:  64/100  Current:     2. Patient will demo ability to complete forward step down from 6\" step without increased valgus collapse or pain to improve ease with stair negotiation.   Status at last assessment: 8/17: no valgus collapse however 4+/10 pain   Current:     3. Patient will report pain 3/10 at worst for progression to PLOF   Status at last assessment:  8/17:  8/10  worst pain  Current:      PLAN  [x]  Upgrade activities as tolerated     [x]  Continue plan of care  []  Update interventions per flow sheet       []  Discharge due to:_   [x]  Other:_ Transfer to  for BFR    NMES unit     Delfina Weir, PT 9/1/2022      Future Appointments   Date Time Provider Joanne Xiao   9/15/2022  2:45 PM Dagmar GEORGES, PT [facility]   9/19/2022  2:45 PM Sindhu Lira, PT [facility]   9/21/2022  9:15 AM Juan Ema., PT [facility]   9/27/2022  3:00 PM Juan Ema., PT [facility]   9/29/2022  1:30 PM Juan Ema., PT [facility]   10/3/2022 10:15 AM Juan Ema., PT [facility]   10/5/2022  9:45 AM Scottie Young, PT [facility]   10/10/2022 10:15 AM Juan Ema., PT [facility]   10/12/2022 10:00 AM Juan Ema., PT [facility]

## 2022-09-15 ENCOUNTER — APPOINTMENT (OUTPATIENT)
Dept: PHYSICAL THERAPY | Age: 37
End: 2022-09-15
Payer: OTHER GOVERNMENT

## 2022-09-19 ENCOUNTER — HOSPITAL ENCOUNTER (OUTPATIENT)
Dept: PHYSICAL THERAPY | Age: 37
Discharge: HOME | End: 2022-09-19
Payer: OTHER GOVERNMENT

## 2022-09-19 PROCEDURE — 97530 THERAPEUTIC ACTIVITIES: CPT

## 2022-09-19 PROCEDURE — 97140 MANUAL THERAPY 1/> REGIONS: CPT

## 2022-09-19 PROCEDURE — 97110 THERAPEUTIC EXERCISES: CPT

## 2022-09-19 NOTE — PROGRESS NOTES
[address] [place] [address] PHYSICAL THERAPY  [facility], [place], [address] - Phone: [phone]  Fax: [phone]  Request for use of Dry Needling/Intramuscular Manual Therapy  Patient Name: Maame Lyle : 1985   Treatment/Medical Diagnosis: Pain in right knee [M25.561]   Referral Source: Josette Gonzalez MD     Date of Initial Visit: 2022 Attended Visits: 35 Missed Visits: 1     Based on findings from the physical therapy examination and evaluation, the evaluating therapist believes the patient, Maame Lyle would benefit from including Dry Needling as part of the plan of care. Dry needling is a treatment technique utilized in conjunction with other PT interventions to inactivate myofascial trigger points and the pain and dysfunction they cause. Dry Needling is an advanced procedure that requires additional training of intensive course work. PROCEDURE:          -  Solid filament sterile needle (typically 0.3mm/30 gauge) inserted into a trigger point          -  Repeated movements inactivate the trigger points, taking 30-60 seconds per site  Typically consists of 1 dry needling session per week and a possible second treatment including muscle re-education, flexibility, strengthening and other manual techniques to facilitate the benefits of dry needling  BENEFITS:  Inactivation of trigger points  Decreased pain  Increased muscle length  Improved movement patterns  Restoration of function POTENTIAL RISKS:  Post-needling soreness  Infection  Bruising/bleeding  Penetration of a nerve  Pneumothorax  All treating PTs have been thoroughly educated in avoiding adverse reactions   If you agree with this recommendation, please sign the attached prescription form and fax it to us at [phone]. If you have questions or concerns regarding dry needling or any other treatment we may be providing, please contact us at [phone].   Thank you for allowing us to assist in the care of your patient. Therapist Signature: Marge Wyatt, SHYANNE Date: 9/19/2022     Time: 4:40 PM   NOTE TO PHYSICIAN:  PLEASE COMPLETE THE ORDERS BELOW AND FAX TO   [facility]: [phone]  If you are unable to process this request in 24 hours please contact our office: [phone]  Check box     I have read the above request and AGREE to the recommendation of including dry needling as part of the plan of care. I have read the above request and DO NOT AGREE to including dry needling as part of the plan of care.     I have read the above report and request that my patient continue therapy with the following changes/special instructions: _________________________________________________     Physician Signature:        Date:       Time:       Markell Bowen MD

## 2022-09-19 NOTE — PROGRESS NOTES
[address] [place] [address] PHYSICAL THERAPY  [facility], [place], [address] - Phone: [phone]  Fax: [phone]  PROGRESS NOTE  Patient Name: Janel Castillo : 1985   Treatment/Medical Diagnosis: Pain in right knee [M25.561]   Referral Source: Oneida Palm MD     Date of Initial Visit: 2022 Attended Visits: 35 Missed Visits: 1     SUMMARY OF TREATMENT  Pt is a 39y.o. year old female who presents with co R medial knee pain S/P knee arthroscopy DOS 22. Treatment has consisted of: therapeutic exercise to improve LE/lumbar strength/mobility; therapeutic activities to improve transfer/lift/carry ability; neuromuscular re-education to improve balance, core stability, neuromuscular control with lifting; physical agent/modality for symptom management; manual therapy for symptom relief and muscle flexibility; patient education to improve symptom management; self Care training; home safety training; stair training, and functional mobility training. CURRENT STATUS  Pt reports 50-60% overall improvement in sx since beginning care. Pt reports 4-5/10 max pain, 2/10 avg pain. Pain made worse with closed chain knee extension. Since last assessment, pt has transferred to [facility] location, where Blood Flow Restriction therapy has been initiated in attempt to improve LE strength/hypertrophy at lower load/less pain. Pt notes she has also began seeing \"out of pocket\" PT who has been dry needling LE; notes some improvement in sx as a result. Improvements: medial knee pain, stair navigation (ascending>descending)  Remaining functional limitations: general patellofemoral pain, lateral patellar pain; locking/catching; lunge, running, riding bike uphill, kicking     Objective Measurements:  MMT:  -knee flex 5/5/ext 4-/5 p!  (through range; isometric brake testing 4+/5)  -hip MMT: flex 4/5, abd 4-/5, add 4/5, ER 4+/5, IR 4+/5, ext 4/5 (mildly delayed R glute max activation)  -ankle DF MMT 5/5    Functional assessment:  -squat good mechanics, pain free  -SL squat R femoral IR, mild genu valgum; p! On concentric phase  -SL HR 20 reps  -SL bridge mild hip drop B but WFL  -1/2 kneel ankle DF WNL B    Quad girth (contracted) R 44.5cm /L 45.5 cm      Goal/Measure of Progress Goal Met? 1. Patient will increase FOTO score to 80/100 for indications of increased functional mobility   Status at last Eval: 64/100 Current Status: Not tested today n/a   2. Patient will demo ability to complete forward step down from 6\" step without increased valgus collapse or pain to improve ease with stair negotiation. Status at last Eval: no valgus collapse however 4+/10 pain  Current Status: Femoral IR, significant pain/patellofemoral crepitus no     3. Patient will report pain 3/10 at worst for progression to PLOF    Status at last Eval: 8/10 Current Status: 5/10 progress   4. Patient will demonstrate right quadriceps circumferential girth measurement= to L, to promote symmetrical quadriceps strength to improve confidence with stair negotiation. Status at last Eval: 41 cm (contracted) Current Status: 45.5 cm (1\" cm difference from L) progress       New Goals to be achieved in __4__  weeks:  1. Patient will increase FOTO score to 80/100 for indications of increased functional mobility  2. Patient will demo ability to complete forward step down from 6\" step without increased valgus collapse or pain to improve ease with stair negotiation. 3. Patient will report pain 3/10 at worst for progression to PLOF   4. Patient will demonstrate right quadriceps circumferential girth measurement= to L, to promote symmetrical quadriceps strength to improve confidence with stair negotiation. RECOMMENDATIONS  Pt notes no significant progress since last assessment, however likely related to only having attended 1 session of PT 2' scheduling conflicts.  Pt would benefit from continued PT as per POC in order to improve LE strength. Pt also demonstrates significant weakness to R glute and inc muscle imbalances medial to lateral leg on the R, contributing to poor patellofemoral mechanics. Continue PT 1-2x/wk x 4 additional weeks. Also recommending dry needling as per pt's subjective reports of improvement with needling from external PT. Please see attached dry needling request.     If you have any questions/comments please contact us directly at [phone]. Thank you for allowing us to assist in the care of your patient. Therapist Signature: Hector Rodas PT Date: 9/19/2022     Time: 1:48 PM   NOTE TO PHYSICIAN:  PLEASE COMPLETE THE ORDERS BELOW AND FAX TO   [facility]: [phone]  If you are unable to process this request in 24 hours please contact our office: [phone]    ___ I have read the above report and request that my patient continue as recommended.   ___ I have read the above report and request that my patient continue therapy with the following changes/special instructions:_________________________________________________________   ___ I have read the above report and request that my patient be discharged from therapy.      Physician Signature:        Date:       Time:       Kari Tello MD

## 2022-09-19 NOTE — PROGRESS NOTES
PHYSICAL THERAPY - DAILY TREATMENT NOTE    Patient Name: Destinee Wu        Date: 2022  : 1985   yes Patient  Verified  Visit #:   28   of   39-41  Insurance: Payor: VERNELL / Plan: Servando Roth 74 / Product Type:  /      In time: 2:45 Out time: 4:05   Total Treatment Time: 80     Medicare/[facility] Time Tracking (below)   Total Timed Codes (min):  na 1:1 Treatment Time:  na     TREATMENT AREA =  Pain in right knee [M25.561]    SUBJECTIVE  Pain Level (on 0 to 10 scale):  2  / 10   Medication Changes/New allergies or changes in medical history, any new surgeries or procedures?    no  If yes, update Summary List   Subjective Functional Status/Changes:  []  No changes reported     \"I started DN with another PT out of pocket and I think it's been helping\". Reports having no significant soreness after BFR last tx session. See PN       OBJECTIVE  33 min Therapeutic Exercise:  see flow sheet   Rationale: increase ROM, increase strength, improve coordination, improve balance, and increase proprioception to improve the patients ability to progress to functional activities limited by pain or other dysfunction     35 min Therapeutic Activity:  see flow sheet; formal reassessment   Rationale: to improve functional activities, model real life movements and performance specific to the patients need with supervision to return the patient to their PLOF     Blood Flow Restriction Procedure Note    Blood Flow Restriction Session Number:  2    If applicable, were there any adverse reactions to the last blood flow restriction therapy session? no      Chart reviewed for the following:  IDante, PT, have reviewed the medical history, summary list and precautions/contraindications for Destinee Wu.     TIME OUT performed immediately prior to start of procedure:  3:18 pm (enter time the timeout was completed)  Elvie TAVAREZ, PT, have performed the following reviews on Gay Blair prior to the start of the session:        [x] Patient was identified by name and date of birth    [x] Purpose of blood flow restriction therapy, side effects, possible complications, risks and benefits were explained to the patient   [x] Procedure site(s) verified  [x] Informed Consent was signed (initial visit) and verified verbally (subsequent visits)  [x] Patient was instructed on the need to report any new medical conditions, procedures or medications. [x] How to respond to possible adverse effects of treatment  [x] Opportunity was given to ask any questions, all questions were answered            Location(s) of blood flow restriction cuffs:   UE:   []  Right     []  Left             LE:    [x]  Right    []  Left           Pressure applied at the cuffs: 202 mm Hg (75% of total limb occlusion pressure) to create a hypoxic environment within the muscle that allows for anaerobic metabolism and recruitment of larger/fast twitch motor units.     Patients response to todays treatment:   [x]  General muscle soreness []  Numbness/tingling of extremity    []  Dizziness      []  Other:      [x] Skin assessment post-treatment:    [x]  intact    [x]  redness- no adverse reaction     []  redness - adverse reaction:          12 min Manual Therapy: TPR to R iliopsoas, rectus femoris, sartorius, pectineus, genu articularis; SL QHF str R   Rationale: decrease pain, increase ROM, increase tissue extensibility, and decrease trigger points to perform functional tasks  The manual therapy interventions were performed at a separate and distinct time from the therapeutic activities interventions      Billed With/As:   [x] TE   [x] TA   [] Neuro   [] Self Care Patient Education: [x] Review HEP    [] Progressed/Changed HEP based on:   [] positioning   [] body mechanics   [] transfers   [] heat/ice application    [x] other: added step hip flexor str and v-sit adductor str to HEP     Other Objective/Functional Measures:    -attempted eccentric decline squat with BFR, but c/o inc pain after 4 reps to retro patellar  -BFR performed with: TRX squats, SL bodyweight hip thrust; TRX lateral lunges w/ RNT, KB RDL, sled pushes, SL hip/clam raise; bridges to failure, and standing banded TKE to failure  -heavy cue to avoid femoral IR throughout various exercises  -sig soft tissue restrictions noted to R hip flexors and pectineus     Post Treatment Pain Level (on 0 to 10) scale:   1  / 10     ASSESSMENT  Assessment/Changes in Function:     BFR occlusion pressure increased to 75% 2' pt reporting no significant fatigue or muscle soreness. See PN     [x]  See Progress Note/Recertification   Patient will continue to benefit from skilled PT services to modify and progress therapeutic interventions, address functional mobility deficits, address ROM deficits, address strength deficits, analyze and address soft tissue restrictions, analyze and cue movement patterns, analyze and modify body mechanics/ergonomics, assess and modify postural abnormalities, and instruct in home and community integration to attain remaining goals.    Progress toward goals / Updated goals:    See PN     PLAN  [x]  Upgrade activities as tolerated yes Continue plan of care   []  Discharge due to :    []  Other:      Therapist: Veronica Carrera PT    Date: 9/19/2022 Time: 1:46 PM     Future Appointments   Date Time Provider Joanne Xiao   9/19/2022  2:45 PM Oly Mccartney, PT [facility]   9/21/2022  9:15 AM Stephen CHRISTIANSON, PT [facility]   9/27/2022  3:00 PM Macel Lundborg., PT [facility]   9/29/2022  1:30 PM Macel Lundborg., PT [facility]   10/3/2022 10:15 AM Macel Lundborg., PT [facility]   10/5/2022  9:45 AM Carolina Young, PT [facility]   10/10/2022 10:15 AM Macel Lundborg., PT [facility]   10/12/2022 10:00 AM Macel Lundborg., PT [facility]

## 2022-09-21 ENCOUNTER — HOSPITAL ENCOUNTER (OUTPATIENT)
Dept: PHYSICAL THERAPY | Age: 37
Discharge: HOME | End: 2022-09-21
Payer: OTHER GOVERNMENT

## 2022-09-21 PROCEDURE — 97110 THERAPEUTIC EXERCISES: CPT

## 2022-09-21 PROCEDURE — 97530 THERAPEUTIC ACTIVITIES: CPT

## 2022-09-21 PROCEDURE — 97140 MANUAL THERAPY 1/> REGIONS: CPT

## 2022-09-21 NOTE — PROGRESS NOTES
PHYSICAL THERAPY - DAILY TREATMENT NOTE    Patient Name: Gold Corrigan        Date: 2022  : 1985   yes Patient  Verified  Visit #:   39   of   39-28  Insurance: Payor:  / Plan: Servando Roth 74 / Product Type:  /      In time: 9:16 AM Out time: 10:31   Total Treatment Time: 75     Medicare/BCBS Time Tracking (below)   Total Timed Codes (min):  61 1:1 Treatment Time:  na     TREATMENT AREA =  Pain in right knee [M25.561]    SUBJECTIVE  Pain Level (on 0 to 10 scale):  3  / 10   Medication Changes/New allergies or changes in medical history, any new surgeries or procedures?    no  If yes, update Summary List   Subjective Functional Status/Changes:  []  No changes reported     Pt c/o increased pain on Tuesday; \"I had been taking it really easy at home so it had been a while\".  Did not notice LE mm soreness       OBJECTIVE  Modalities Rationale:     decrease inflammation, decrease pain, and increase muscle contraction/control to improve patient's ability to tolerate stairs/walking activities  10 min [x] Estim, type/location: [place] to VL in reclined long sitting with concurrent QS; 10/20\"                                     []  att     [x]  unatt     []  w/US     [x]  w/ice    []  w/heat    min []  Mechanical Traction: type/lbs                   []  pro   []  sup   []  int   []  cont    []  before manual    []  after manual    min []  Ultrasound, settings/location:      min []  Iontophoresis w/ dexamethasone, location:                                               []  take home patch       []  in clinic    min []  Ice     []  Heat    location/position:     min []  Vasopneumatic Device, press/temp:        min []  Other:    [x] Skin assessment post-treatment (if applicable):    [x]  intact    []  redness- no adverse reaction                  []redness - adverse reaction:        26 min Therapeutic Exercise:  see flow sheet  (-4 min bike)   Rationale: increase ROM, increase strength, improve coordination, improve balance, and increase proprioception to improve the patients ability to progress to functional activities limited by pain or other dysfunction     25 min Therapeutic Activity:  see flow sheet   Rationale: to improve functional activities, model real life movements and performance specific to the patients need with supervision to return the patient to their PLOF     Blood Flow Restriction Procedure Note    Blood Flow Restriction Session Number:  3    If applicable, were there any adverse reactions to the last blood flow restriction therapy session? no      Chart reviewed for the following:  Delfina TAVAREZ, PT, have reviewed the medical history, summary list and precautions/contraindications for Freddy. TIME OUT performed immediately prior to start of procedure:  9:57 AM  (enter time the timeout was completed)  Delfina TAVAREZ, PT, have performed the following reviews on [place] prior to the start of the session:        [x] Patient was identified by name and date of birth    [x] Purpose of blood flow restriction therapy, side effects, possible complications, risks and benefits were explained to the patient   [x] Procedure site(s) verified  [x] Informed Consent was signed (initial visit) and verified verbally (subsequent visits)  [x] Patient was instructed on the need to report any new medical conditions, procedures or medications.   [x] How to respond to possible adverse effects of treatment  [x] Opportunity was given to ask any questions, all questions were answered            Location(s) of blood flow restriction cuffs:   UE:   []  Right     []  Left             LE:    [x]  Right    []  Left           Pressure applied at the cuffs: 162 mm Hg (60% of total limb occlusion pressure) to create a hypoxic environment within the muscle that allows for anaerobic metabolism and recruitment of larger/fast twitch motor units. (1st attempted at previous 162 mmHg, however c/o unable to complete full mm contraction on attempt of hip thrust and pressure adjusted down to recorded level)    Patients response to todays treatment:   [x]  General muscle soreness []  Numbness/tingling of extremity    []  Dizziness      []  Other:      [x] Skin assessment post-treatment:    [x]  intact    [x]  redness- no adverse reaction     []  redness - adverse reaction:          10 min Manual Therapy: METs for pubic clearing, R ant/L post rotated innominate. Manual R QL stretch in L s/l. Rationale: decrease pain, increase ROM, increase tissue extensibility, and decrease trigger points to perform functional tasks  The manual therapy interventions were performed at a separate and distinct time from the therapeutic activities interventions      Billed With/As:   [x] TE   [x] TA   [] Neuro   [] Self Care Patient Education: [x] Review HEP    [] Progressed/Changed HEP based on:   [] positioning   [] body mechanics   [] transfers   [] heat/ice application    [x] other:      Other Objective/Functional Measures:  SI check: R anteriorly/L post rotated innominate    -pt c/o tera-medial knee pain with lateral band walks; states increased pain today vs last session  -added single leg RDL for dynamic stability  -cues for glute activation with band TKE's, even on eccentric release as pt noted \"feels like my knee cap catches\" on release; sx's improved with increased glute recruitment  -added hip thrust band for lateral TRX lunges; no knee pain today     Post Treatment Pain Level (on 0 to 10) scale:   0  / 10     ASSESSMENT  Assessment/Changes in Function:   Pt noted to have good correction of pelvic obliquity with manual therapy. PT notes decreased glute recruitment/strength throughout session and will plan to emphasize progression of glute strength for core/LE stability.       [x]  See Progress Note/Recertification   Patient will continue to benefit from skilled PT services to modify and progress therapeutic interventions, address functional mobility deficits, address ROM deficits, address strength deficits, analyze and address soft tissue restrictions, analyze and cue movement patterns, analyze and modify body mechanics/ergonomics, assess and modify postural abnormalities, and instruct in home and community integration to attain remaining goals. Progress toward goals / Updated goals:  -no significant changes yet from PN completed last session     New Goals to be achieved in __4__  weeks:  1. Patient will increase FOTO score to 80/100 for indications of increased functional mobility  2. Patient will demo ability to complete forward step down from 6\" step without increased valgus collapse or pain to improve ease with stair negotiation. 3. Patient will report pain 3/10 at worst for progression to PLOF   4. Patient will demonstrate right quadriceps circumferential girth measurement= to L, to promote symmetrical quadriceps strength to improve confidence with stair negotiation. PLAN  [x]  Upgrade activities as tolerated yes Continue plan of care   []  Discharge due to :    []  Other:      Therapist: Penny Perry.  Charbel Baum PT    Date: 9/21/2022 Time: 10:31 AM      Future Appointments   Date Time Provider Joanne Xiao   9/21/2022  9:15 AM Jamie Louis., PT [facility]   9/27/2022  3:00 PM Jamie Louis., PT [facility]   9/29/2022  1:30 PM Jamie Louis., PT [facility]   10/3/2022 10:15 AM Jamie Louis., PT [facility]   10/5/2022  9:45 AM Hayley Youngzen, PT [facility]   10/10/2022 10:15 AM Jamie Louis., PT [facility]   10/12/2022 10:00 AM Jamie Louis., PT [facility]

## 2022-09-27 ENCOUNTER — APPOINTMENT (OUTPATIENT)
Dept: PHYSICAL THERAPY | Age: 37
End: 2022-09-27
Payer: OTHER GOVERNMENT

## 2022-09-29 ENCOUNTER — HOSPITAL ENCOUNTER (OUTPATIENT)
Dept: PHYSICAL THERAPY | Age: 37
Discharge: HOME | End: 2022-09-29
Payer: OTHER GOVERNMENT

## 2022-09-29 PROCEDURE — 97140 MANUAL THERAPY 1/> REGIONS: CPT

## 2022-09-29 PROCEDURE — 97530 THERAPEUTIC ACTIVITIES: CPT

## 2022-09-29 PROCEDURE — 97110 THERAPEUTIC EXERCISES: CPT

## 2022-09-29 NOTE — PROGRESS NOTES
PHYSICAL THERAPY - DAILY TREATMENT NOTE    Patient Name: Merced Manzo        Date: 2022  : 1985   yes Patient  Verified  Visit #:   40   of   39-19  Insurance: Payor:  / Plan: Servando Roth 74 / Product Type:  /      In time: 1:30 PM Out time: 2:35   Total Treatment Time: 65     Medicare/[employer] Time Tracking (below)   Total Timed Codes (min):  65 1:1 Treatment Time:  na     TREATMENT AREA =  Pain in right knee [M25.561]    SUBJECTIVE  Pain Level (on 0 to 10 scale):  0  / 10   Medication Changes/New allergies or changes in medical history, any new surgeries or procedures?    no  If yes, update Summary List   Subjective Functional Status/Changes:  []  No changes reported   Pt states she had recent DN treatment. She has been using theragun on adductor tejal and DN on hamstring really seem to be helping. Pt states she did light squats and side lunges at home as well as SLR's with ankle weights with no pain.         OBJECTIVE  Modalities Rationale:     decrease inflammation, decrease pain, and increase muscle contraction/control to improve patient's ability to tolerate stairs/walking activities  ND min [x] Estim, type/location: [place] to VL in reclined long sitting with concurrent QS; 10/20\"                                     []  att     [x]  unatt     []  w/US     [x]  w/ice    []  w/heat    min []  Mechanical Traction: type/lbs                   []  pro   []  sup   []  int   []  cont    []  before manual    []  after manual    min []  Ultrasound, settings/location:      min []  Iontophoresis w/ dexamethasone, location:                                               []  take home patch       []  in clinic    min []  Ice     []  Heat    location/position:     min []  Vasopneumatic Device, press/temp:        min []  Other:    [x] Skin assessment post-treatment (if applicable):    [x]  intact    []  redness- no adverse reaction                  []redness - adverse reaction: 33 min Therapeutic Exercise:  see flow sheet     Rationale: increase ROM, increase strength, improve coordination, improve balance, and increase proprioception to improve the patients ability to progress to functional activities limited by pain or other dysfunction     24 min Therapeutic Activity:  see flow sheet   Rationale: to improve functional activities, model real life movements and performance specific to the patients need with supervision to return the patient to their PLOF     Blood Flow Restriction Procedure Note    Blood Flow Restriction Session Number:  4    If applicable, were there any adverse reactions to the last blood flow restriction therapy session? no      Chart reviewed for the following:  Delfina TAVAREZ, PT, have reviewed the medical history, summary list and precautions/contraindications for Freddy. TIME OUT performed immediately prior to start of procedure:  2:05 PM   (enter time the timeout was completed)  Delfina TAVAREZ, PT, have performed the following reviews on [place] prior to the start of the session:        [x] Patient was identified by name and date of birth    [x] Purpose of blood flow restriction therapy, side effects, possible complications, risks and benefits were explained to the patient   [x] Procedure site(s) verified  [x] Informed Consent was signed (initial visit) and verified verbally (subsequent visits)  [x] Patient was instructed on the need to report any new medical conditions, procedures or medications.   [x] How to respond to possible adverse effects of treatment  [x] Opportunity was given to ask any questions, all questions were answered            Location(s) of blood flow restriction cuffs:   UE:   []  Right     []  Left             LE:    [x]  Right    []  Left           Pressure applied at the cuffs: 162 mm Hg (60% of total limb occlusion pressure) to create a hypoxic environment within the muscle that allows for anaerobic metabolism and recruitment of larger/fast twitch motor units. Patients response to todays treatment:   [x]  General muscle soreness []  Numbness/tingling of extremity    []  Dizziness      []  Other:      [x] Skin assessment post-treatment:    [x]  intact    [x]  redness- no adverse reaction     []  redness - adverse reaction:          8 min Manual Therapy: STM to R QL, lower lumbar paraspinals   Rationale: decrease pain, increase ROM, increase tissue extensibility, and decrease trigger points to perform functional tasks  The manual therapy interventions were performed at a separate and distinct time from the therapeutic activities interventions      Billed With/As:   [x] TE   [x] TA   [] Neuro   [] Self Care Patient Education: [x] Review HEP    [] Progressed/Changed HEP based on:   [] positioning   [] body mechanics   [] transfers   [] heat/ice application    [x] other:      Other Objective/Functional Measures:  SI check: level    -added adductor bridges, adductor slide, post tib HR's, band inversion    BFR Exercises:   -hip thrust, long sit R hip flex, RDL with power band, 3 way lunge, iso wall sits with SB, clam plank     Post Treatment Pain Level (on 0 to 10) scale:   0  / 10     ASSESSMENT  Assessment/Changes in Function:   Pt limited with tolerance for single leg hip thrust today 2/2 c/o hamstring/adductor cramping. Pt demonstrated tibial IR to neutral and increased focus for inversion/tibial IR strengthening to reduce strain to medial hamstrings/adductors. Continued strength progression through RLE and core with ex's performed today. Pt encouraged by improving pain tolerance with lunges which had previously been painful unless done statically.       [x]  See Progress Note/Recertification   Patient will continue to benefit from skilled PT services to modify and progress therapeutic interventions, address functional mobility deficits, address ROM deficits, address strength deficits, analyze and address soft tissue restrictions, analyze and cue movement patterns, analyze and modify body mechanics/ergonomics, assess and modify postural abnormalities, and instruct in home and community integration to attain remaining goals. Progress toward goals / Updated goals:     New Goals to be achieved in __4__  weeks:  1. Patient will increase FOTO score to 80/100 for indications of increased functional mobility  2. Patient will demo ability to complete forward step down from 6\" step without increased valgus collapse or pain to improve ease with stair negotiation. 3. Patient will report pain 3/10 at worst for progression to PLOF. Status at last note/certification: 4/82: 4-5/10  Current: 9/29: Goal in progress; max pain 4/10   4. Patient will demonstrate right quadriceps circumferential girth measurement= to L, to promote symmetrical quadriceps strength to improve confidence with stair negotiation. PLAN  [x]  Upgrade activities as tolerated yes Continue plan of care   []  Discharge due to :    []  Other:      Therapist: Tisha Bloom.  Maira Garcia, PT    Date: 9/29/2022 Time: 2:39 PM      Future Appointments   Date Time Provider Joanne Xiao   9/29/2022  1:30 PM Shantelle Montenegro, PT [facility]   10/3/2022 10:15 AM Shantelle Montenegro, PT [facility]   10/5/2022  9:45 AM Donal Young, PT [facility]   10/10/2022 10:15 AM Shantelle Montenegro, PT [facility]   10/12/2022 10:00 AM Shantelle Montenegro, PT [facility]

## 2022-10-03 ENCOUNTER — HOSPITAL ENCOUNTER (OUTPATIENT)
Dept: PHYSICAL THERAPY | Age: 37
Discharge: HOME | End: 2022-10-03
Payer: OTHER GOVERNMENT

## 2022-10-03 PROCEDURE — 97110 THERAPEUTIC EXERCISES: CPT

## 2022-10-03 PROCEDURE — 97140 MANUAL THERAPY 1/> REGIONS: CPT

## 2022-10-03 PROCEDURE — 97014 ELECTRIC STIMULATION THERAPY: CPT

## 2022-10-03 PROCEDURE — 97530 THERAPEUTIC ACTIVITIES: CPT

## 2022-10-03 NOTE — PROGRESS NOTES
PHYSICAL THERAPY - DAILY TREATMENT NOTE    Patient Name: Nikko Humphrey        Date: 10/3/2022  : 1985   yes Patient  Verified  Visit #:   45   of   39-43  Insurance: Payor: VERNELL / Plan: Servando Roth 74 / Product Type:  /      In time: 10:17 Out time: 11:36   Total Treatment Time: 79     Medicare/BCBS Time Tracking (below)   Total Timed Codes (min):  65 1:1 Treatment Time:  na     TREATMENT AREA =  Pain in right knee [M25.561]    SUBJECTIVE  Pain Level (on 0 to 10 scale):  2-3  / 10   Medication Changes/New allergies or changes in medical history, any new surgeries or procedures?    no  If yes, update Summary List   Subjective Functional Status/Changes:  []  No changes reported   Pt had a little increase in knee pain on Fri (day after PT); she took it easy and woke up Saturday with pain up to 3/10. Pain has persisted over the weekend. \"It had me up a few times last night; changing position it was popping/clicking\"  Pt c/o feeling as though her knee pain continues to cycle through periods where she is building up tolerance and doing better, and then has an acute increase in pain which prevents her from improving further.        OBJECTIVE  Modalities Rationale:     decrease inflammation, decrease pain, and increase muscle contraction/control to improve patient's ability to tolerate stairs/walking activities  10 min [x] Estim, type/location: [place] to VL in reclined long sitting with concurrent QS; 10/20\"                                     []  att     [x]  unatt     []  w/US     [x]  w/ice    []  w/heat    min []  Mechanical Traction: type/lbs                   []  pro   []  sup   []  int   []  cont    []  before manual    []  after manual    min []  Ultrasound, settings/location:      min []  Iontophoresis w/ dexamethasone, location:                                               []  take home patch       []  in clinic    min []  Ice     []  Heat    location/position:     min [] Vasopneumatic Device, press/temp:        min []  Other:    [x] Skin assessment post-treatment (if applicable):    [x]  intact    []  redness- no adverse reaction                  []redness - adverse reaction:        33 min Therapeutic Exercise:  see flow sheet    (-4 min bike)   Rationale: increase ROM, increase strength, improve coordination, improve balance, and increase proprioception to improve the patients ability to progress to functional activities limited by pain or other dysfunction     20 min Therapeutic Activity:  see flow sheet   Rationale: to improve functional activities, model real life movements and performance specific to the patients need with supervision to return the patient to their PLOF     Blood Flow Restriction Procedure Note    Blood Flow Restriction Session Number:  5    If applicable, were there any adverse reactions to the last blood flow restriction therapy session? no      Chart reviewed for the following:  Delfina TAVAREZ, PT, have reviewed the medical history, summary list and precautions/contraindications for Freddy. TIME OUT performed immediately prior to start of procedure:  11:00 AM   (enter time the timeout was completed)  Delfina TAVAREZ PT, have performed the following reviews on [place] prior to the start of the session:        [x] Patient was identified by name and date of birth    [x] Purpose of blood flow restriction therapy, side effects, possible complications, risks and benefits were explained to the patient   [x] Procedure site(s) verified  [x] Informed Consent was signed (initial visit) and verified verbally (subsequent visits)  [x] Patient was instructed on the need to report any new medical conditions, procedures or medications.   [x] How to respond to possible adverse effects of treatment  [x] Opportunity was given to ask any questions, all questions were answered            Location(s) of blood flow restriction cuffs: UE:   []  Right     []  Left             LE:    [x]  Right    []  Left           Pressure applied at the cuffs: 162 mm Hg (60% of total limb occlusion pressure) to create a hypoxic environment within the muscle that allows for anaerobic metabolism and recruitment of larger/fast twitch motor units. Patients response to todays treatment:   [x]  General muscle soreness []  Numbness/tingling of extremity    []  Dizziness      []  Other:      [x] Skin assessment post-treatment:    [x]  intact    [x]  redness- no adverse reaction     []  redness - adverse reaction:          12 min Manual Therapy: IASTM to R medial knee, VL, rectus femoris. METs for pubic clearing, R anteriorly rotated innominate   Rationale: decrease pain, increase ROM, increase tissue extensibility, and decrease trigger points to perform functional tasks  The manual therapy interventions were performed at a separate and distinct time from the therapeutic activities interventions      Billed With/As:   [x] TE   [x] TA   [] Neuro   [] Self Care Patient Education: [x] Review HEP    [] Progressed/Changed HEP based on:   [] positioning   [] body mechanics   [] transfers   [] heat/ice application    [x] other:      Other Objective/Functional Measures:  SI check: R anteriorly rotated innominate  (-) miguel a Winter's, valgus stress    -attempted self SI correction, however unable to clear innominate rotation independently; good correction after manual       Post Treatment Pain Level (on 0 to 10) scale:   2  / 10     ASSESSMENT  Assessment/Changes in Function:   Pt ttp to medial knee joint line plica; states pain refers sup/inf about medial patellar border. Pt with moderate soft tissue congestion noted to rectus femoris and VL with IASTM. Pt states she will be f/u MD soon and questions if she might be able to mention getting an additional consult/referral to different ortho. Pt unable to tolerate squat/lunge movements today and this was held.      [x] See Progress Note/Recertification   Patient will continue to benefit from skilled PT services to modify and progress therapeutic interventions, address functional mobility deficits, address ROM deficits, address strength deficits, analyze and address soft tissue restrictions, analyze and cue movement patterns, analyze and modify body mechanics/ergonomics, assess and modify postural abnormalities, and instruct in home and community integration to attain remaining goals. Progress toward goals / Updated goals:     New Goals to be achieved in __4__  weeks:  1. Patient will increase FOTO score to 80/100 for indications of increased functional mobility  2. Patient will demo ability to complete forward step down from 6\" step without increased valgus collapse or pain to improve ease with stair negotiation. 3. Patient will report pain 3/10 at worst for progression to PLOF. Status at last note/certification: 4/05: 4-5/10  Current: 9/29: Goal in progress; max pain 4/10   4. Patient will demonstrate right quadriceps circumferential girth measurement= to L, to promote symmetrical quadriceps strength to improve confidence with stair negotiation. PLAN  [x]  Upgrade activities as tolerated yes Continue plan of care   []  Discharge due to :    []  Other:      Therapist: Rony Moyer, SHYANNE    Date: 10/3/2022 Time: 1:22 PM      Future Appointments   Date Time Provider Joanne Xiao   10/3/2022 10:15 AM Nazario Rivera., PT [facility]   10/5/2022  9:45 AM Ashlee Young, PT [facility]   10/10/2022 10:15 AM Nazario Rivera., PT [facility]   10/12/2022 10:00 AM Nazario Rivera., PT [facility]

## 2022-10-05 ENCOUNTER — HOSPITAL ENCOUNTER (OUTPATIENT)
Dept: PHYSICAL THERAPY | Age: 37
Discharge: HOME | End: 2022-10-05
Payer: OTHER GOVERNMENT

## 2022-10-05 PROCEDURE — 97140 MANUAL THERAPY 1/> REGIONS: CPT

## 2022-10-05 PROCEDURE — 97110 THERAPEUTIC EXERCISES: CPT

## 2022-10-05 PROCEDURE — 97530 THERAPEUTIC ACTIVITIES: CPT

## 2022-10-05 NOTE — PROGRESS NOTES
PHYSICAL THERAPY - DAILY TREATMENT NOTE    Patient Name: Leopold Jarvis        Date: 10/5/2022  : 1985   yes Patient  Verified  Visit #:   44   of   39-43  Insurance: Payor: VERNELL / Plan: Servando Roth 74 / Product Type:  /      In time: 9:49 Out time: 11:12   Total Treatment Time: 83     Medicare/[facility] Time Tracking (below)   Total Timed Codes (min):  na 1:1 Treatment Time:  na     TREATMENT AREA =  Pain in right knee [M25.561]    SUBJECTIVE  Pain Level (on 0 to 10 scale):  1  / 10   Medication Changes/New allergies or changes in medical history, any new surgeries or procedures?    no  If yes, update Summary List   Subjective Functional Status/Changes:  []  No changes reported     Pt reports seeing Dr. Caitie Walter yesterday Robb Diaz still hasn't really given me any concrete answers. He put me on an extended Medrol pack but I haven't filled it. He also offered another injection but I don't think I want to do that\". Reports improvement in pain from last session but took it easy yesterday \"just stretched\". OBJECTIVE    33 min Therapeutic Exercise:  see flow sheet      Rationale: increase ROM, increase strength, improve coordination, improve balance, and increase proprioception to improve the patients ability to progress to functional activities limited by pain or other dysfunction     30 min Therapeutic Activity:  see flow sheet   Rationale: to improve functional activities, model real life movements and performance specific to the patients need with supervision to return the patient to their PLOF     Blood Flow Restriction Procedure Note    Blood Flow Restriction Session Number:  6    If applicable, were there any adverse reactions to the last blood flow restriction therapy session? no      Chart reviewed for the following:  IGloria, PT, have reviewed the medical history, summary list and precautions/contraindications for Leopold Jarvis.     TIME OUT performed immediately prior to start of procedure:  10:46 AM   (enter time the timeout was completed)  Rafat TAVAREZ PT, have performed the following reviews on Octavia Reed prior to the start of the session:        [x] Patient was identified by name and date of birth    [x] Purpose of blood flow restriction therapy, side effects, possible complications, risks and benefits were explained to the patient   [x] Procedure site(s) verified  [x] Informed Consent was signed (initial visit) and verified verbally (subsequent visits)  [x] Patient was instructed on the need to report any new medical conditions, procedures or medications. [x] How to respond to possible adverse effects of treatment  [x] Opportunity was given to ask any questions, all questions were answered            Location(s) of blood flow restriction cuffs:   UE:   []  Right     []  Left             LE:    [x]  Right    []  Left           Pressure applied at the cuffs: 162 mm Hg (60% of total limb occlusion pressure) to create a hypoxic environment within the muscle that allows for anaerobic metabolism and recruitment of larger/fast twitch motor units.      Patients response to todays treatment:   [x]  General muscle soreness []  Numbness/tingling of extremity    []  Dizziness      [x]  Other: fatigue     [x] Skin assessment post-treatment:    [x]  intact    [x]  redness- no adverse reaction     []  redness - adverse reaction:          20 min Manual Therapy: IASTM to R MPFL, distal ITB, VL; DTM/TPR to R adductor tejal, gracilis   Rationale: decrease pain, increase ROM, increase tissue extensibility, and decrease trigger points to perform functional tasks  The manual therapy interventions were performed at a separate and distinct time from the therapeutic activities interventions      Billed With/As:   [x] TE   [x] TA   [] Neuro   [] Self Care Patient Education: [x] Review HEP    [] Progressed/Changed HEP based on:   [] positioning   [] body mechanics   [] transfers   [] heat/ice application    [x] other:      Other Objective/Functional Measures:    -laterally displaced patellar positioning R  -TTP along MPFL  -no gross instability noted to R patella or excessive med/lat glide  -attempted superband assisted eccentric SL Squats, c/o inc medial knee pain after 3 reps therefore discontinued  -able to resume lateral walks w/ band at met heads but required regression in resistance  -sig ttp to distal 1/3 ITB and along MPFL       Post Treatment Pain Level (on 0 to 10) scale:  0  / 10     ASSESSMENT  Assessment/Changes in Function:     Pt reporting significant improvement in pain following IASTM. 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 With presence of laterally displaced patella R, differential dx to include sprain to R MPFL (though no significantly patellar laxity noted). 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 Discussed tx plan likely would remain the same regardless of dx, as exercise rx selected to treat impairments specific to pt and not dx. [x]  See Progress Note/Recertification   Patient will continue to benefit from skilled PT services to modify and progress therapeutic interventions, address functional mobility deficits, address ROM deficits, address strength deficits, analyze and address soft tissue restrictions, analyze and cue movement patterns, analyze and modify body mechanics/ergonomics, assess and modify postural abnormalities, and instruct in home and community integration to attain remaining goals. Progress toward goals / Updated goals:     New Goals to be achieved in __4__  weeks:  1. Patient will increase FOTO score to 80/100 for indications of increased functional mobility  2.  Patient will demo ability to complete forward step down from 6\" step without increased valgus collapse or pain to improve ease with stair negotiation. 10/5/22: unable to tolerate assisted eccentric SL squat; goal not met  3. Patient will report pain 3/10 at worst for progression to PLOF. Status at last note/certification: 4/98: 4-5/10  Current: 9/29: Goal in progress; max pain 4/10   4. Patient will demonstrate right quadriceps circumferential girth measurement= to L, to promote symmetrical quadriceps strength to improve confidence with stair negotiation.         PLAN  [x]  Upgrade activities as tolerated yes Continue plan of care   []  Discharge due to :    []  Other:      Therapist: Leonard Felton PT    Date: 10/5/2022 Time: 1:22 PM      Future Appointments   Date Time Provider Joanne Xiao   10/5/2022  9:45 AM Dee Plummer PT [facility]   10/10/2022 10:15 AM Saba Ricci, PT [facility]   10/12/2022 10:00 AM Saba Ricci, PT [facility]

## 2022-10-10 ENCOUNTER — HOSPITAL ENCOUNTER (OUTPATIENT)
Dept: PHYSICAL THERAPY | Age: 37
Discharge: HOME | End: 2022-10-10
Payer: OTHER GOVERNMENT

## 2022-10-10 PROCEDURE — 97530 THERAPEUTIC ACTIVITIES: CPT

## 2022-10-10 PROCEDURE — 97110 THERAPEUTIC EXERCISES: CPT

## 2022-10-10 PROCEDURE — 97140 MANUAL THERAPY 1/> REGIONS: CPT

## 2022-10-10 NOTE — PROGRESS NOTES
PHYSICAL THERAPY - DAILY TREATMENT NOTE    Patient Name: Sonya Ghosh        Date: 10/10/2022  : 1985   yes Patient  Verified  Visit #:   36   of   39-43  Insurance: Payor: VERNELL / Plan: Servando Roth 74 / Product Type:  /      In time: 10:17 Out time: 11:15   Total Treatment Time: 58     Medicare/[facility] Time Tracking (below)   Total Timed Codes (min):  na 1:1 Treatment Time:  na     TREATMENT AREA =  Pain in right knee [M25.561]    SUBJECTIVE  Pain Level (on 0 to 10 scale):  1  / 10   Medication Changes/New allergies or changes in medical history, any new surgeries or procedures?    no  If yes, update Summary List   Subjective Functional Status/Changes:  []  No changes reported   Pt states she had a massage to her leg recently and there was a lot of tension to her ITB so she's continued to foam roll that area since. OBJECTIVE    38 min Therapeutic Exercise:  see flow sheet      Rationale: increase ROM, increase strength, improve coordination, improve balance, and increase proprioception to improve the patients ability to progress to functional activities limited by pain or other dysfunction     10 min Therapeutic Activity:  see flow sheet   Rationale: to improve functional activities, model real life movements and performance specific to the patients need with supervision to return the patient to their PLOF     Blood Flow Restriction Procedure Note    Blood Flow Restriction Session Number:  7    If applicable, were there any adverse reactions to the last blood flow restriction therapy session? no      Chart reviewed for the following:  Delfina TAVAREZ, PT, have reviewed the medical history, summary list and precautions/contraindications for Sonya Pacini. TIME OUT performed immediately prior to start of procedure:  10:32 AM   (enter time the timeout was completed)  Delfina TAVAREZ, PT, have performed the following reviews on Sonya Pacini prior to the start of the session:        [x] Patient was identified by name and date of birth    [x] Purpose of blood flow restriction therapy, side effects, possible complications, risks and benefits were explained to the patient   [x] Procedure site(s) verified  [x] Informed Consent was signed (initial visit) and verified verbally (subsequent visits)  [x] Patient was instructed on the need to report any new medical conditions, procedures or medications. [x] How to respond to possible adverse effects of treatment  [x] Opportunity was given to ask any questions, all questions were answered            Location(s) of blood flow restriction cuffs:   UE:   []  Right     []  Left             LE:    [x]  Right    []  Left           Pressure applied at the cuffs: 162 mm Hg (60% of total limb occlusion pressure) to create a hypoxic environment within the muscle that allows for anaerobic metabolism and recruitment of larger/fast twitch motor units. Patients response to todays treatment:   [x]  General muscle soreness []  Numbness/tingling of extremity    []  Dizziness      [x]  Other: fatigue     [x] Skin assessment post-treatment:    [x]  intact    [x]  redness- no adverse reaction     []  redness - adverse reaction:          10 min Manual Therapy: IASTM to MPFL, distal quads.    Mart taping medial glide to right patella   Rationale: decrease pain, increase ROM, increase tissue extensibility, and decrease trigger points to perform functional tasks  The manual therapy interventions were performed at a separate and distinct time from the therapeutic activities interventions      Billed With/As:   [x] TE   [x] TA   [] Neuro   [] Self Care Patient Education: [x] Review HEP    [] Progressed/Changed HEP based on:   [] positioning   [] body mechanics   [] transfers   [] heat/ice application    [x] other:      Other Objective/Functional Measures:  Patellar position: 9 cm from medial femoral condyle to mid patella; 8.5 cm from lateral femoral condyle to mid patella    -Pt c/o medial knee pain with RLE loading after ~15' of lateral band walk; unable to tolerate retro walk 2/2 knee pain    BFR exercises:   -SLR flex, KB dead lift with hip thrust, [place] squat, clam plank, LAQ       Post Treatment Pain Level (on 0 to 10) scale:  1.5  / 10     ASSESSMENT  Assessment/Changes in Function:   Pt noting medial knee pain after 1.5 laps lateral band walks which continued to exacerbate primarily with loading activity on RLE. Pt noted to have crepitus with LAQ in hip IR (VL dominance), but not with neutral or ER alignment; patellar position corrected with Mart taping and pt noting slight improvement in tolerance for lateral band walk, however increased pain with LAQ with VL bias (however pain is lateral thigh and not her medial knee pain). Pt advised to keep tape on for up to 5 days if determined to be beneficial for her medial knee pain without causing a significant adverse pain response to any other areas of her leg. [x]  See Progress Note/Recertification   Patient will continue to benefit from skilled PT services to modify and progress therapeutic interventions, address functional mobility deficits, address ROM deficits, address strength deficits, analyze and address soft tissue restrictions, analyze and cue movement patterns, analyze and modify body mechanics/ergonomics, assess and modify postural abnormalities, and instruct in home and community integration to attain remaining goals. Progress toward goals / Updated goals:     New Goals to be achieved in __4__  weeks:  1. Patient will increase FOTO score to 80/100 for indications of increased functional mobility  2. Patient will demo ability to complete forward step down from 6\" step without increased valgus collapse or pain to improve ease with stair negotiation. 10/5/22: unable to tolerate assisted eccentric SL squat; goal not met  3.  Patient will report pain 3/10 at worst for progression to PLOF. Status at last note/certification: 9/40: 4-5/10  Current: 9/29: Goal in progress; max pain 4/10   4. Patient will demonstrate right quadriceps circumferential girth measurement= to L, to promote symmetrical quadriceps strength to improve confidence with stair negotiation. Current: 10/10: Goal in progress: R 39.7 cm, L 37.9 (measured 5 cm from superior patella); measurement obtained after BFR and therefore may be skewed        PLAN  [x]  Upgrade activities as tolerated yes Continue plan of care   []  Discharge due to :    []  Other:      Therapist: Stanislav Mayorga.  Mandy Mackey, PT    Date: 10/10/2022 Time: 12:49 PM      Future Appointments   Date Time Provider Joanne Xiao   10/12/2022 10:00 AM Romeo Chavez., PT [facility]   10/20/2022 11:45 AM Romeo Chavez., PT [facility]   10/24/2022  2:45 PM Dc Mcneal, PT [facility]   10/27/2022 11:45 AM Romeo Chavez., PT [facility]   10/31/2022  7:45 AM Romeo Chavez., PT [facility]   11/2/2022 10:00 AM Romeo Chavez., PT [facility]   11/7/2022 10:00 AM Korina Young, PT [facility]

## 2022-10-12 ENCOUNTER — HOSPITAL ENCOUNTER (OUTPATIENT)
Dept: PHYSICAL THERAPY | Age: 37
Discharge: HOME | End: 2022-10-12
Payer: OTHER GOVERNMENT

## 2022-10-12 PROCEDURE — 97530 THERAPEUTIC ACTIVITIES: CPT

## 2022-10-12 PROCEDURE — 97140 MANUAL THERAPY 1/> REGIONS: CPT

## 2022-10-12 PROCEDURE — 97535 SELF CARE MNGMENT TRAINING: CPT

## 2022-10-12 PROCEDURE — 97110 THERAPEUTIC EXERCISES: CPT

## 2022-10-12 NOTE — PROGRESS NOTES
PHYSICAL THERAPY - DAILY TREATMENT NOTE    Patient Name: Tae Evans        Date: 10/12/2022  : 1985   yes Patient  Verified  Visit #:   39   of   39-43  Insurance: Payor: VERNELL / Plan: Servando Roth 74 / Product Type:  /      In time: 5319  Out time: 110    Total Treatment Time: 55      Medicare/[facility] Time Tracking (below)   Total Timed Codes (min):  na 1:1 Treatment Time:  na     TREATMENT AREA =  Pain in right knee [M25.561]    SUBJECTIVE  Pain Level (on 0 to 10 scale):  1-2   10   Medication Changes/New allergies or changes in medical history, any new surgeries or procedures?    no  If yes, update Summary List   Subjective Functional Status/Changes:  []  No changes reported   Pt reports that the tape has helped reduce medial knee pain. Was able to walk 1.5 miles yesterday and 1 mile this morning prior to therapy w/ minimal pain. Presents w/ tape still in place. No S&S of skin irritation noted. Pt going for massage today and notes that she may take her tape off. OBJECTIVE    15 min Therapeutic Exercise:  see flow sheet      Rationale: increase ROM, increase strength, improve coordination, improve balance, and increase proprioception to improve the patients ability to progress to functional activities limited by pain or other dysfunction     15 min Therapeutic Activity:  see flow sheet   Rationale: to improve functional activities, model real life movements and performance specific to the patients need with supervision to return the patient to their PLOF     15  min Manual Therapy:   Barron taping medial glide to right patella in place, not changed today  CFM to the medial coronary ligament R knee x 15 seconds to no pain, repeat assessment indicated no pain  Assess: hip ROM, ankle ROM, TCJ mobility, calcaneal inv/ever without abnormalities noted in either LE.    Assessed: tib fib joint mobility w/ slight hypomobility noted on the R, grade II/III mobs completed for mobility   Rationale: decrease pain, increase ROM, increase tissue extensibility, and decrease trigger points to perform functional tasks  The manual therapy interventions were performed at a separate and distinct time from the therapeutic activities interventions      Billed With/As:   [] TE   [] TA   [] Neuro   [x] Self Care x10 min  Patient Education: [x]  Review HEP, intent of all current treatment, discussed patient concerns  - pt noted that she may seek a 2nd opinion 2/2 cont knee pain, with another ortho MD  -pt inquired about taking the 1 week oral steroid 2/2 fear of taking it. PT encouraged pt to take as prescribed and educated pt on the pain cycle, healing process  -discussed pt current concerns re: all treatment she has received and # of visits she has had. Despite improvements, cont to have pain. PT and pt discussed advocating for her care, ni if she feels \"something isn't right\". - PT encouraged pt to write down her questions/concerns for MD at the next appt, ni re: treatment rendered, sessions attended, medications/treatment taken, and offered to assist w/ this (see copy in the chart, additional copy made for pt to be given to her at her next visit)  [] Progressed/Changed HEP based on:   [] positioning   [] body mechanics   [] transfers   [] heat/ice application    [] other:      Other Objective/Functional Measures:  -v.c. for slow and controlled glute prep activity vs increased speed of movement  - mirror used for glute prep to allow for visual feedback of proper positioning of the LEs  - pt tearful during part of session 2/2 cont pain and decreased ability to participate in PLOF activity (see self care)  - Assessment of hip/knee/ankle and tib/fib joint (see above in Manual section)  - Held BFR today 2/2 PT seeing pt today not trained in this.  Pt verbally agreeable and stated that she did not want to do that today 2/2 inc muscle fatigue last visit    - appears to have increased pain w/ weight shift over the RLE when knee is in slight flexed position. Addressed this w/ breaking down walking mechanics to include:   - resisted hip ext in standing  - standing eccentric step fwd/backwards in // bars for support/confidence in weight shift over the RLE     Post Treatment Pain Level (on 0 to 10) scale:  1  / 10     ASSESSMENT  Assessment/Changes in Function:   Pt with improved tolerance to functional activity today, completing lateral walks without any increased pain. Retro walking cont to be painful, therefore avoided this today, breaking down the gait mechanics into components to assess tolerance. Pt reported decreased crepitus in the R knee w/ taping and noted fatigue/discomfort in the R lateral knee this session. See above for further self care and pt education. Post tx, pt denied any increased S&S, noting pain 1/10. [x]  See Progress Note/Recertification   Patient will continue to benefit from skilled PT services to modify and progress therapeutic interventions, address functional mobility deficits, address ROM deficits, address strength deficits, analyze and address soft tissue restrictions, analyze and cue movement patterns, analyze and modify body mechanics/ergonomics, assess and modify postural abnormalities, and instruct in home and community integration to attain remaining goals. Progress toward goals / Updated goals:     New Goals to be achieved in __4__  weeks:  1. Patient will increase FOTO score to 80/100 for indications of increased functional mobility  2. Patient will demo ability to complete forward step down from 6\" step without increased valgus collapse or pain to improve ease with stair negotiation. 10/5/22: unable to tolerate assisted eccentric SL squat; goal not met  3. Patient will report pain 3/10 at worst for progression to PLOF. Status at last note/certification: 8/28: 4-5/10  Current: 9/29: Goal in progress; max pain 4/10   4.  Patient will demonstrate right quadriceps circumferential girth measurement= to L, to promote symmetrical quadriceps strength to improve confidence with stair negotiation.   Current: 10/10: Goal in progress: R 39.7 cm, L 37.9 (measured 5 cm from superior patella); measurement obtained after BFR and therefore may be skewed        PLAN  [x]  Upgrade activities as tolerated yes Continue plan of care   []  Discharge due to :    []  Other:      Therapist: Baldev Oglesby, PT, DPT, CMPT    Date: 10/12/2022 Time: 1102       Future Appointments   Date Time Provider Joanne Xiao   10/12/2022 10:00 AM [facility] PT CHILLED POND 2 [facility]   10/20/2022 11:45 AM Francesca CHRISTIANSON, PT [facility]   10/24/2022  2:45 PM Otis Ziegler, PT [facility]   10/27/2022 11:45 AM Francesca CHRISTIANSON, PT [facility]   10/31/2022  7:45 AM Vick Anderson, PT [facility]   11/2/2022 10:00 AM Vick Anderson, PT [facility]   11/7/2022 10:00 AM Raisa Young, PT [facility]

## 2022-10-20 ENCOUNTER — HOSPITAL ENCOUNTER (OUTPATIENT)
Dept: PHYSICAL THERAPY | Age: 37
Discharge: HOME | End: 2022-10-20
Payer: OTHER GOVERNMENT

## 2022-10-20 PROCEDURE — 97110 THERAPEUTIC EXERCISES: CPT

## 2022-10-20 PROCEDURE — 97530 THERAPEUTIC ACTIVITIES: CPT

## 2022-10-20 NOTE — PROGRESS NOTES
[address] [place] [address] PHYSICAL THERAPY  [facility] [address], [place], [address] - Phone: [phone]  Fax: [phone]  PROGRESS NOTE  Patient Name: Messi Serrano : 1985   Treatment/Medical Diagnosis: Pain in right knee [M25.561]   Referral Source: Rosana Flannery MD     Date of Initial Visit: 22 Attended Visits: 42 Missed Visits: 1     SUMMARY OF TREATMENT  Pt is a 39y.o. year old female who presents with co R medial knee pain S/P knee arthroscopy DOS 22. Treatment has consisted of: therapeutic exercise to improve LE/lumbar strength/mobility; therapeutic activities to improve transfer/lift/carry ability; neuromuscular re-education to improve balance, core stability, neuromuscular control with lifting; physical agent/modality for symptom management; manual therapy for symptom relief and muscle flexibility; patient education to improve symptom management; self Care training; home safety training; stair training, and functional mobility training. CURRENT STATUS  Pt reports 55% overall improvement in sx over the past 2 months. Pt reports 8/10 max pain, 1/10 avg pain. Pain made worse with increased R knee flexed weight bearing positions. Pt notes increase in 'popping and cracking' after completing plyometric exercises around and after 22. Pt continues to make slow and inconsistent progress through out the beginning of her OPPT trial.  Since the beginning of her OPPT trial, she has remained to have functional limitations including; weight bearing in flexed position, running, jumping, closed chain twisting, and reports increased pain/discomfort levels during all previously listed activities. Pt reports beginning corticosteriods 4-5 days prior to today's visit, \"I think it's helping\". She states \"I started working out at home again, doing beach body videos and weight lifting. Focusing on glute, core,and upper body\".      Improvements: increased strength, ROM, flexibility, decreased max pain levels,   Remaining functional limitations: weight bearing in flexed position, no running, jumping, closed chain twisting, pain/discomfort levels. Increased in pain while descending/ascending stairs     Objective Measurements:  MMT:  -knee flex 5/5 , ext  4+/5 p!   -hip MMT: flex 5/5, abd 4+/5, ER 4+/5, IR 4+/5, ext 5/5   -ankle DF MMT 5/5       Functional assessment:  -squat good mechanics, pain free   -SL squat R femoral IR, mild genu valgum; p! After two  -SL HR 25 reps   -SL bridge mild hip drop B but WFL     FOTO 60/100    New Goals to be achieved in __4__  weeks:  1. Patient will increase FOTO score to 80/100 for indications of increased functional mobility. Current: slow to progress, 60/100  2. Patient will demo ability to complete forward step down from 6\" step without increased valgus collapse or pain to improve ease with stair negotiation. 10/5/22: unable to tolerate assisted eccentric SL squat; goal not met  Current:  goal progressing, Pt reports P! During descending stairs. Demonstrates instability during SL squat. 3. Patient will report pain 3/10 at worst for progression to PLOF. Status at last note/certification: 2/11: 4-5/10. 9/29: Goal in progress; max pain 4/10   Current: goal progressing, max pain 8/10  4. Patient will demonstrate right quadriceps circumferential girth measurement= to L, to promote symmetrical quadriceps strength to improve confidence with stair negotiation. 10/10: R 39.7 cm, L 37.9 (measured 5 cm from superior patella); measurement obtained after BFR and therefore may be skewed. Current: goal progressing, R 39.7cm, L 39.2cm (measured 5cm from superior patella), measurement taken after BFR and could possibly be skewed. New Goals to be achieved in __4__  weeks:  1. Patient will increase FOTO score to 80/100 for indications of increased functional mobility  2.  Patient will demo ability to complete forward step down from 6\" step without increased valgus collapse or pain to improve ease with stair negotiation. 3. Patient will report pain 3/10 at worst for progression to PLOF   4. Patient will demonstrate right quadriceps circumferential girth measurement= to L, to promote symmetrical quadriceps strength to improve confidence with stair negotiation. RECOMMENDATIONS:  Continue with current POC for 2x/week for 4 weeks. If you have any questions/comments please contact us directly at [phone]. Thank you for allowing us to assist in the care of your patient. Therapist Signature: Therese Barney PTA Date: 10/20/2022    Nica Mcgarry PT, DPT, CMTPT Time: 9:07 AM   NOTE TO PHYSICIAN:  PLEASE COMPLETE THE ORDERS BELOW AND FAX TO   [facility]: [phone]  If you are unable to process this request in 24 hours please contact our office: [phone]    ___ I have read the above report and request that my patient continue as recommended.   ___ I have read the above report and request that my patient continue therapy with the following changes/special instructions:_________________________________________________________   ___ I have read the above report and request that my patient be discharged from therapy.      Physician Signature:        Date:       Time:       Jennifer Marcial MD

## 2022-10-20 NOTE — PROGRESS NOTES
PHYSICAL THERAPY - DAILY TREATMENT NOTE    Patient Name: Arturo Sin        Date: 10/20/2022  : 1985   yes Patient  Verified  Visit #:   43   of   39-43  Insurance: Payor: VERNELL / Plan: Servando Roth 74 / Product Type:  /      In time: 11:47 Out time: 12:50   Total Treatment Time: 63        TREATMENT AREA =  Pain in right knee [M25.561]    SUBJECTIVE  Pain Level (on 0 to 10 scale):  1  / 10   Medication Changes/New allergies or changes in medical history, any new surgeries or procedures?    no  If yes, update Summary List   Subjective Functional Status/Changes:  []  No changes reported   Pt reports beginning corticosteriods 4-5 days prior to today's visit, \"I think it's helping\". She states \"I started working out at home again, doing beach body videos and weight lifting. Focusing on glute, core,and upper body\". OBJECTIVE    33 min Therapeutic Exercise:  see flow sheet    Reassessment  FOTO    Rationale: increase ROM, increase strength, improve coordination, improve balance, and increase proprioception to improve the patients ability to progress to functional activities limited by pain or other dysfunction     30 min Therapeutic Activity:  see flow sheet  Reassessment  FOTO   Rationale: to improve functional activities, model real life movements and performance specific to the patients need with supervision to return the patient to their PLOF      Blood Flow Restriction Procedure Note     Blood Flow Restriction Session Number:  8     If applicable, were there any adverse reactions to the last blood flow restriction therapy session? no     Chart reviewed for the following:  Mic TAVAREZ PT, DPT, have reviewed the medical history, summary list and precautions/contraindications for Arturo Sin.      TIME OUT performed immediately prior to start of procedure:  12:32 pM   (enter time the timeout was completed)  Mic TAVAREZ PT, DPT, have performed the following reviews on Maxim Amezquita prior to the start of the session:         [x] Patient was identified by name and date of birth    [x] Purpose of blood flow restriction therapy, side effects, possible complications, risks and benefits were explained to the patient   [x] Procedure site(s) verified  [x] Informed Consent was signed (initial visit) and verified verbally (subsequent visits)  [x] Patient was instructed on the need to report any new medical conditions, procedures or medications. [x] How to respond to possible adverse effects of treatment  [x] Opportunity was given to ask any questions, all questions were answered            Location(s) of blood flow restriction cuffs:   UE:   []  Right     []  Left             LE:    [x]  Right    []  Left            Pressure applied at the cuffs: 162 mm Hg (60% of total limb occlusion pressure) to create a hypoxic environment within the muscle that allows for anaerobic metabolism and recruitment of larger/fast twitch motor units.       Patients response to todays treatment:   [x]  General muscle soreness []  Numbness/tingling of extremity    []  Dizziness      [x]  Other: fatigue      [x] Skin assessment post-treatment:    [x]  intact    [x]  redness- no adverse reaction     []  redness - adverse reaction:          X  min Manual Therapy:   TC    Rationale: decrease pain, increase ROM, increase tissue extensibility, and decrease trigger points to perform functional tasks  The manual therapy interventions were performed at a separate and distinct time from the therapeutic activities interventions      Billed With/As:   [] TE   [] TA   [] Neuro Patient Education: [x]  Review HEP    [] Progressed/Changed HEP based on:   [] positioning   [] body mechanics   [] transfers   [] heat/ice application    [] other:      Other Objective/Functional Measures:  MMT:  -knee flex 5/5 , ext  4+/5 p!   -hip MMT: flex 5/5, abd 4+/5, ER 4+/5, IR 4+/5, ext 5/5   -ankle DF MMT 5/5 Functional assessment:  -squat good mechanics, pain free   -SL squat R femoral IR, mild genu valgum; p! After two  -SL HR 25 reps   -SL bridge mild hip drop B but WFL      FOTO 60/100   Post Treatment Pain Level (on 0 to 10) scale:  0  / 10     ASSESSMENT  Assessment/Changes in Function:   Pt reports 55% overall improvement in sx over the past 2 months. Pt reports 8/10 max pain, 1/10 avg pain. Pain made worse with increased R knee flexed weight bearing positions. Pt notes increase in 'popping and cracking' after completing plyometric exercises around and after 7/4/22. Pt continues to make slow and inconsistent progress through out the beginning of her OPPT trial.  Since the beginning of her OPPT trial, she has remained to have functional limitations including; weight bearing in flexed position, running, jumping, closed chain twisting, and reports increased pain/discomfort levels during all previously listed activities. Pt reports beginning corticosteriods 4-5 days prior to today's visit, \"I think it's helping\". She states \"I started working out at home again, doing beach body videos and weight lifting. Focusing on glute, core,and upper body\". Pt seeking possible second opinion regarding lasting pain levels of R knee after significant time spend in OPPT. Amina Servin PT, DPT present for BFR treatment (set up, time-out, direct supervision of PTA)     [x]  See Progress Note/Recertification   Patient will continue to benefit from skilled PT services to modify and progress therapeutic interventions, address functional mobility deficits, address ROM deficits, address strength deficits, analyze and address soft tissue restrictions, analyze and cue movement patterns, analyze and modify body mechanics/ergonomics, assess and modify postural abnormalities, and instruct in home and community integration to attain remaining goals.    Progress toward goals / Updated goals:     New Goals to be achieved in __4__ weeks: 1. Patient will increase FOTO score to 80/100 for indications of increased functional mobility  Current: goal progressing slowly, 60/100 FOTO  2. Patient will demo ability to complete forward step down from 6\" step without increased valgus collapse or pain to improve ease with stair negotiation. 10/5/22: unable to tolerate assisted eccentric SL squat; goal not met  Current: goal progressing, Pt reports P! During descending stairs. Demonstrates instability during SL squat. 3. Patient will report pain 3/10 at worst for progression to PLOF. Status at last note/certification: 8/77: 4-5/10.  9/29: Goal in progress; max pain 4/10   Current:  Pt reports  4. Patient will demonstrate right quadriceps circumferential girth measurement= to L, to promote symmetrical quadriceps strength to improve confidence with stair negotiation. Current: 10/10: Goal in progress: R 39.7 cm, L 37.9 (measured 5 cm from superior patella); measurement obtained after BFR and therefore may be skewed  Current: R 39.7cm, L 39.2cm (measured 5cm from superior patella), measurement taken after BFR and could possibly be skewed.      PLAN  [x]  Upgrade activities as tolerated yes Continue plan of care   []  Discharge due to :    [x]  Other: See PN     Therapist: Berenice Adkins PTA/Elvie Young PT, DPT    Date: 10/20/2022 Time: 11:02       Future Appointments   Date Time Provider Joanne Xiao   10/24/2022  2:45 PM Adriana Rivas, PT [facility]   10/27/2022 11:45 AM Tennille Kevin., PT [facility]   10/31/2022  7:45 AM Tennille Kevin., PT [facility]   11/2/2022 10:00 AM Tennille Kevin., PT [facility]   11/7/2022 10:00 AM Gardenia Young, PT [facility]

## 2022-10-24 ENCOUNTER — HOSPITAL ENCOUNTER (OUTPATIENT)
Dept: PHYSICAL THERAPY | Age: 37
Discharge: HOME | End: 2022-10-24
Payer: OTHER GOVERNMENT

## 2022-10-24 PROCEDURE — 97530 THERAPEUTIC ACTIVITIES: CPT

## 2022-10-24 PROCEDURE — 97140 MANUAL THERAPY 1/> REGIONS: CPT

## 2022-10-24 PROCEDURE — 97110 THERAPEUTIC EXERCISES: CPT

## 2022-10-24 NOTE — PROGRESS NOTES
PHYSICAL THERAPY - DAILY TREATMENT NOTE    Patient Name: Sonya Ghosh        Date: 10/24/2022  : 1985   yes Patient  Verified  Visit #:   37  of  48   Insurance: Payor: VERNELL / Plan: Servando Roth 74 / Product Type:  /      In time: 2:47 Out time: 3:57   Total Treatment Time: 70       TREATMENT AREA =  Pain in right knee [M25.561]    SUBJECTIVE  Pain Level (on 0 to 10 scale):  2  / 10   Medication Changes/New allergies or changes in medical history, any new surgeries or procedures?    no  If yes, update Summary List   Subjective Functional Status/Changes:  []  No changes reported     Pt reports the last day of her steroid pack she started noticing her pain increasing \"it came back with a vengeance on Saturday\". Reports she started a second pack on Saturday and has noticed some improvement. \"I don't understand why, I up-d my activity but I wasn't     Top 3 concerns:  1) pain (primarily   2) limited activity participation (playing with kids, dancing, exercising)  3) unable to sit crossed legged or sleeping    Max pain 7/10; min pain 1/10, avg pain 2-3/10    Had some benefit with oral steroids but pain returned at higher level once complete    Notes continued instability but notes less than before. Audible crepitus to R knee with knee flexion.        OBJECTIVE    30 min Therapeutic Exercise:  see flow sheet     Rationale: increase ROM, increase strength, improve coordination, improve balance, and increase proprioception to improve the patients ability to progress to functional activities limited by pain or other dysfunction     30 min Therapeutic Activity:  see flow sheet   Rationale: to improve functional activities, model real life movements and performance specific to the patients need with supervision to return the patient to their PLOF      Blood Flow Restriction Procedure Note     Blood Flow Restriction Session Number:  9     If applicable, were there any adverse reactions to the last blood flow restriction therapy session? no     Chart reviewed for the following:  Gus TAVAREZ Officer PTPAUL, have reviewed the medical history, summary list and precautions/contraindications for Freddy. TIME OUT performed immediately prior to start of procedure:  3:30 (enter time the timeout was completed)  Gus TAVAREZ Officer PTPAUL, have performed the following reviews on Freddy prior to the start of the session:         [x] Patient was identified by name and date of birth    [x] Purpose of blood flow restriction therapy, side effects, possible complications, risks and benefits were explained to the patient   [x] Procedure site(s) verified  [x] Informed Consent was signed (initial visit) and verified verbally (subsequent visits)  [x] Patient was instructed on the need to report any new medical conditions, procedures or medications. [x] How to respond to possible adverse effects of treatment  [x] Opportunity was given to ask any questions, all questions were answered            Location(s) of blood flow restriction cuffs:   UE:   []  Right     []  Left             LE:    [x]  Right    []  Left            Pressure applied at the cuffs: 162 mm Hg (60% of total limb occlusion pressure) to create a hypoxic environment within the muscle that allows for anaerobic metabolism and recruitment of larger/fast twitch motor units.       Patients response to todays treatment:   [x]  General muscle soreness []  Numbness/tingling of extremity    []  Dizziness      [x]  Other: fatigue      [x] Skin assessment post-treatment:    [x]  intact    []  redness- no adverse reaction     []  redness - adverse reaction:          10 min Manual Therapy: IASTM to R MCL, MPFL, pes anserine; STM to gracilis, adductor longus   Rationale: decrease pain, increase ROM, increase tissue extensibility, and decrease trigger points to perform functional tasks  The manual therapy interventions were performed at a separate and distinct time from the therapeutic activities interventions    Billed With/As:   [x] TE   [x] TA   [] Neuro Patient Education: [x]  Review HEP  [] Progressed/Changed HEP based on:   [] positioning   [] body mechanics   [] transfers   [] heat/ice application    [] other:      Other Objective/Functional Measures:    -cues for upright trunk with Serbian squats  -mild pinching to R medial knee (adductors) w/ KB RDLs  -TTP along pes anserine and superior aspect of MFC     Post Treatment Pain Level (on 0 to 10) scale:   1 / 10     ASSESSMENT  Assessment/Changes in Function:     Pt has reached a plateau in progress at this time; discussed 30 day hold while she awaits second opinion. Reviewed HEP and advised continuation to avoid regression of strength gains. [x]  See Progress Note/Recertification   Patient will continue to benefit from skilled PT services to modify and progress therapeutic interventions, address functional mobility deficits, address ROM deficits, address strength deficits, analyze and address soft tissue restrictions, analyze and cue movement patterns, analyze and modify body mechanics/ergonomics, assess and modify postural abnormalities, and instruct in home and community integration to attain remaining goals. Progress toward goals / Updated goals:     1. Patient will increase FOTO score to 80/100 for indications of increased functional mobility  2. Patient will demo ability to complete forward step down from 6\" step without increased valgus collapse or pain to improve ease with stair negotiation. 3. Patient will report pain 3/10 at worst for progression to PLOF   4. Patient will demonstrate right quadriceps circumferential girth measurement= to L, to promote symmetrical quadriceps strength to improve confidence with stair negotiation.         PLAN  []  Upgrade activities as tolerated yes Continue plan of care   []  Discharge due to :    [x]  Other: Pt to be placed on 30 day hold while she awaits 2nd opinon     Therapist: Arleth Aaron, PT    Date: 10/24/2022 Time: 11:02       Future Appointments   Date Time Provider Joanne Xiao   10/24/2022  2:45 PM Garfield Hogan, PT [facility]   10/27/2022 11:45 AM [facility] PT [facility]   10/31/2022  7:45 AM Pat [place]., PT [facility]   11/2/2022 10:00 AM [facility]., PT [facility]   11/7/2022 10:00 AM Delano Young, PT [facility]

## 2022-10-27 ENCOUNTER — APPOINTMENT (OUTPATIENT)
Dept: PHYSICAL THERAPY | Age: 37
End: 2022-10-27
Payer: OTHER GOVERNMENT

## 2022-10-31 ENCOUNTER — APPOINTMENT (OUTPATIENT)
Dept: PHYSICAL THERAPY | Age: 37
End: 2022-10-31
Payer: OTHER GOVERNMENT

## 2022-11-02 ENCOUNTER — APPOINTMENT (OUTPATIENT)
Dept: PHYSICAL THERAPY | Age: 37
End: 2022-11-02

## 2022-11-07 ENCOUNTER — APPOINTMENT (OUTPATIENT)
Dept: PHYSICAL THERAPY | Age: 37
End: 2022-11-07

## 2024-01-16 ENCOUNTER — HOSPITAL ENCOUNTER (OUTPATIENT)
Facility: HOSPITAL | Age: 39
Setting detail: SPECIMEN
Discharge: HOME | End: 2024-01-19
Payer: OTHER GOVERNMENT

## 2024-01-16 PROCEDURE — 88175 CYTOPATH C/V AUTO FLUID REDO: CPT

## 2024-01-16 PROCEDURE — 87624 HPV HI-RISK TYP POOLED RSLT: CPT
